# Patient Record
Sex: FEMALE | Race: WHITE | NOT HISPANIC OR LATINO | Employment: FULL TIME | URBAN - NONMETROPOLITAN AREA
[De-identification: names, ages, dates, MRNs, and addresses within clinical notes are randomized per-mention and may not be internally consistent; named-entity substitution may affect disease eponyms.]

---

## 2017-03-20 ENCOUNTER — OFFICE VISIT - GICH (OUTPATIENT)
Dept: SURGERY | Facility: OTHER | Age: 27
End: 2017-03-20

## 2017-03-20 ENCOUNTER — HISTORY (OUTPATIENT)
Dept: FAMILY MEDICINE | Facility: OTHER | Age: 27
End: 2017-03-20

## 2017-03-20 ENCOUNTER — HOSPITAL ENCOUNTER (OUTPATIENT)
Dept: RADIOLOGY | Facility: OTHER | Age: 27
End: 2017-03-20
Attending: NURSE PRACTITIONER

## 2017-03-20 ENCOUNTER — OFFICE VISIT - GICH (OUTPATIENT)
Dept: FAMILY MEDICINE | Facility: OTHER | Age: 27
End: 2017-03-20

## 2017-03-20 DIAGNOSIS — L02.411 CUTANEOUS ABSCESS OF RIGHT AXILLA: ICD-10-CM

## 2017-03-20 DIAGNOSIS — L02.91 CUTANEOUS ABSCESS: ICD-10-CM

## 2017-03-20 DIAGNOSIS — L73.2 HIDRADENITIS SUPPURATIVA: ICD-10-CM

## 2017-03-20 DIAGNOSIS — L02.92 FURUNCLE: ICD-10-CM

## 2017-03-20 DIAGNOSIS — M79.621 PAIN OF RIGHT UPPER ARM: ICD-10-CM

## 2017-03-20 LAB
ABSOLUTE BASOPHILS - HISTORICAL: 0.1 THOU/CU MM
ABSOLUTE EOSINOPHILS - HISTORICAL: 0.9 THOU/CU MM
ABSOLUTE LYMPHOCYTES - HISTORICAL: 3.6 THOU/CU MM (ref 0.9–2.9)
ABSOLUTE MONOCYTES - HISTORICAL: 1.2 THOU/CU MM
ABSOLUTE NEUTROPHILS - HISTORICAL: 10.9 THOU/CU MM (ref 1.7–7)
ANION GAP - HISTORICAL: 8 (ref 5–18)
BASOPHILS # BLD AUTO: 0.8 %
BUN SERPL-MCNC: 7 MG/DL (ref 7–25)
BUN/CREAT RATIO - HISTORICAL: 10
CALCIUM SERPL-MCNC: 10.7 MG/DL (ref 8.6–10.3)
CHLORIDE SERPLBLD-SCNC: 105 MMOL/L (ref 98–107)
CO2 SERPL-SCNC: 23 MMOL/L (ref 21–31)
CREAT SERPL-MCNC: 0.7 MG/DL (ref 0.7–1.3)
EOSINOPHIL NFR BLD AUTO: 5.4 %
ERYTHROCYTE [DISTWIDTH] IN BLOOD BY AUTOMATED COUNT: 13.6 % (ref 11.5–15.5)
GFR IF NOT AFRICAN AMERICAN - HISTORICAL: >60 ML/MIN/1.73M2
GLUCOSE SERPL-MCNC: 91 MG/DL (ref 70–105)
HCT VFR BLD AUTO: 40.6 % (ref 33–51)
HEMOGLOBIN: 13.2 G/DL (ref 12–16)
LYMPHOCYTES NFR BLD AUTO: 21.7 % (ref 20–44)
MCH RBC QN AUTO: 25.6 PG (ref 26–34)
MCHC RBC AUTO-ENTMCNC: 32.5 G/DL (ref 32–36)
MCV RBC AUTO: 79 FL (ref 80–100)
MONOCYTES NFR BLD AUTO: 7 %
NEUTROPHILS NFR BLD AUTO: 65.1 % (ref 42–72)
PLATELET # BLD AUTO: 347 THOU/CU MM (ref 140–440)
PMV BLD: 7.4 FL (ref 6.5–11)
POTASSIUM SERPL-SCNC: 3.4 MMOL/L (ref 3.5–5.1)
RED BLOOD COUNT - HISTORICAL: 5.17 MIL/CU MM (ref 4–5.2)
SODIUM SERPL-SCNC: 136 MMOL/L (ref 133–143)
WHITE BLOOD COUNT - HISTORICAL: 16.8 THOU/CU MM (ref 4.5–11)

## 2017-03-21 ENCOUNTER — COMMUNICATION - GICH (OUTPATIENT)
Dept: SURGERY | Facility: OTHER | Age: 27
End: 2017-03-21

## 2017-03-22 ENCOUNTER — COMMUNICATION - GICH (OUTPATIENT)
Dept: FAMILY MEDICINE | Facility: OTHER | Age: 27
End: 2017-03-22

## 2017-03-23 ENCOUNTER — AMBULATORY - GICH (OUTPATIENT)
Dept: SURGERY | Facility: OTHER | Age: 27
End: 2017-03-23

## 2017-03-23 DIAGNOSIS — L02.818 CUTANEOUS ABSCESS OF OTHER SITES: ICD-10-CM

## 2017-03-23 LAB
CULTURE - HISTORICAL: ABNORMAL
CULTURE - HISTORICAL: ABNORMAL
GRAM STAIN: ABNORMAL
SPECIMEN DESCRIPTION - HISTORICAL: ABNORMAL
SUSCEPTIBILITY RESULT - HISTORICAL: ABNORMAL

## 2017-03-24 ENCOUNTER — HISTORY (OUTPATIENT)
Dept: SURGERY | Facility: OTHER | Age: 27
End: 2017-03-24

## 2017-03-24 ENCOUNTER — OFFICE VISIT - GICH (OUTPATIENT)
Dept: SURGERY | Facility: OTHER | Age: 27
End: 2017-03-24

## 2017-03-24 DIAGNOSIS — L02.91 CUTANEOUS ABSCESS: ICD-10-CM

## 2017-03-31 ENCOUNTER — OFFICE VISIT - GICH (OUTPATIENT)
Dept: SURGERY | Facility: OTHER | Age: 27
End: 2017-03-31

## 2017-03-31 DIAGNOSIS — L02.411 CUTANEOUS ABSCESS OF RIGHT AXILLA: ICD-10-CM

## 2018-01-03 NOTE — NURSING NOTE
Patient Information     Patient Name MRN Antonette Arias [id number] Female 1990      Nursing Note by Karma Abarca at 3/20/2017  3:15 PM     Author:  Karma Abarca Service:  (none) Author Type:  (none)     Filed:  3/20/2017  3:49 PM Encounter Date:  3/20/2017 Status:  Signed     :  Karma Abarca            Antonette Sears is a 26 y.o. Female here with an abscess in right armpit. States went to a walk in clinic in [place] this past weekend and had it drained.  Today now is more red, swollen and painful.  Was not started on an antibiotic.   Patricia Abarca LPN ...... 3/20/2017 3:18 PM

## 2018-01-04 NOTE — TELEPHONE ENCOUNTER
Patient Information     Patient Name MRAntonette Koehler [id number] Female 1990      Telephone Encounter by Sunita Polo at 3/22/2017 12:41 PM     Author:  Sunita Polo Service:  (none) Author Type:  (none)     Filed:  3/22/2017 12:50 PM Encounter Date:  3/22/2017 Status:  Signed     :  Sunita Polo            Spoke with Patient and she states that she was given Norco 5/325 for her abscess.  She states she is premedicating before her packing changes and she is still needing 2 to 3 pills due to the pain.  Is there any other option for pain control.  She states she is just miserable.   Sunita Polo LPN........................3/22/2017  12:50 PM

## 2018-01-04 NOTE — TELEPHONE ENCOUNTER
Patient Information     Patient Name MRN Sex Antonette Nice [id number] Female 1990      Telephone Encounter by Dominique Ibarra DO at 3/21/2017 12:15 PM     Author:  Dominique Ibarra DO  Service:  (none) Author Type:  PHYS- Osteopathic     Filed:  3/21/2017 12:17 PM  Encounter Date:  3/21/2017 Status:  Addendum     :  Dominique Ibarra DO (PHYS- Osteopathic)        Related Notes: Original Note by Dominique Ibarra DO (PHYS- Osteopathic) filed at 3/21/2017 12:16 PM            Patient started on Augmentin  We cultured the wound here as well last pm- these are still pending.    Should cover proteus  Should have follow up appointment for Friday w/ [place]

## 2018-01-04 NOTE — TELEPHONE ENCOUNTER
Patient Information     Patient Name MRN Antonette Arias [id number] Female 1990      Telephone Encounter by Clarita Coffman at 3/21/2017 11:13 AM     Author:  Clarita Coffman Service:  (none) Author Type:  (none)     Filed:  3/21/2017 11:17 AM Encounter Date:  3/21/2017 Status:  Signed     :  Clarita Coffman            Patient wanted us to know that  Culture swab from [facility] came back as Proteus?  It was done on Saturday.  Clarita Coffman LPN ....................  3/21/2017   11:17 AM

## 2018-01-04 NOTE — TELEPHONE ENCOUNTER
Patient Information     Patient Name MRN Antonette Arias [id number] Female 1990      Telephone Encounter by Clarita Coffman at 3/22/2017  1:46 PM     Author:  Clarita Coffman Service:  (none) Author Type:  (none)     Filed:  3/22/2017  1:49 PM Encounter Date:  3/22/2017 Status:  Signed     :  Clarita Coffman            I called patient and she said she is changing the dressing 2 times a day and it is very painful and uses 2-3 pain pills for every dressing change.  Asking for something stronger.  Does not want to run out of pills. Please advise.    Clarita Coffman LPN ....................  3/22/2017   1:49 PM

## 2018-01-04 NOTE — TELEPHONE ENCOUNTER
Patient Information     Patient Name MRN Antonette Arias [id number] Female 1990      Telephone Encounter by Clarita Coffman at 3/21/2017  2:27 PM     Author:  Clarita Coffman Service:  (none) Author Type:  (none)     Filed:  3/21/2017  2:27 PM Encounter Date:  3/21/2017 Status:  Signed     :  Clarita Coffman            Patient notified.  Clarita Coffman LPN ....................  3/21/2017   2:27 PM

## 2018-01-04 NOTE — PROGRESS NOTES
Patient Information     Patient Name MRN Sex Antonette Nice [id number] Female 1990      Progress Notes by Dominique Ibarra DO at 3/31/2017  2:30 PM     Author:  Dominique Ibarra DO Service:  (none) Author Type:  PHYS- Osteopathic     Filed:  4/3/2017  1:33 PM Encounter Date:  3/31/2017 Status:  Signed     :  Dominique Ibarra DO (PHYS- Osteopathic)            Patient is doing well post-op from there recent I & D right axillae.  She has x2 more days  Of antibiotics.  Still c/o pain.  Min drainage    She has been having no nausea or vomiting; no chest pain, shortness of breath or coughing up anything. Patient has been urinating without any difficulties and moving bowel w/ no straining or bleeding. Patient has no calf pain swelling, tenderness, or redness. Patient has been sleeping at night with no problems. Patient has been ambulating without difficulty. Patient has been able to tolerate a regular diet. Patient has had good relief with previously prescribed pain meds.  No diarrhea.  No fevers.     LMP  (LMP Unknown)    HEENT: all negative  No jaundice.  No eye redness or pain.  No throat pain.  L: CTA b/l  Abdom: + BS. no distensions.  LOWER EXTREMITY: no swelling or calf pain  The surrounding tissue is pink and healthy.  There is no redness or swelling.  Min drainage.   ?  Pathology: see Epic  ?      Pt should keep the area clean and dry: wash with plain soap and water. Keep covered. Does not need to put anything on the lesion.   No strenuous activity or hot tubs/sauna's/pool/lake x1 week. Ice and NSAID's for pain. Monitor for redness/drainage/swelling/increase in pain/temp > 101. May have serous drainage/should not be purulent. Call Clinic if these occur.  As far as heeling: may be red and firm for about 1-3 weeks. This is the normal heeling process and will smooth out to a silvery/white line. Avoid sun exposure X1 year. May take months for redness to dissipate. If is sun burnt, will stay red.  Can use vit E oil.  Today we discussed wound care, activities, return to work, warnings signs. Pt is doing well.    Can stop packing.  Finish antibiotics.  Just wash w/ soap and water several times a day.  She has had C.  Dif in the past so chronic minocycline therapy is probably not an option for her.  Stop shaving and use clippers.  Also needs to stop smoking- esig still has nicotine in it.  Losing weight would also help.  Patient father has as well.  Could consider topical clindamycin if recurs.       Patient Active Problem List     Diagnosis  Code     CONTRACEPTIVE MANAGEMENT Z30.09     ABDOMINAL PAIN RIGHT LOWER QUADRANT \T\ EPIGASTRIC R10.31     ASTHMA, PERSISTENT, MILD J45.909     GERD K21.9     MIGRAINE HEADACHE G43.909     ALLERGIC RHINITIS J30.9     OTITIS MEDIA, LEFT H66.90     UNS D/O MENSTRUATION\T\OTH ABN BLEED FE GNT TRACT N92.6, N93.9     Cutaneous abscess of right axilla L02.411         Call the office if have any questions or concern's.  F/u with PCP for routine medical care.  Nielsen not require further pain med's.  Will F/U :myron Ibarra, DO

## 2018-01-04 NOTE — PATIENT INSTRUCTIONS
Patient Information     Patient Name MRN Sex Antonette Nice [id number] Female 1990      Patient Instructions by Gely Kennedy NP at 3/20/2017  3:15 PM     Author:  Gely Kennedy NP Service:  (none) Author Type:  PHYS- Nurse Practitioner     Filed:  3/20/2017  6:32 PM Encounter Date:  3/20/2017 Status:  Signed     :  Gely Kennedy NP (PHYS- Nurse Practitioner)            Follow-up with surgeon  as scheduled    Call surgeon's office for any concerns as needed

## 2018-01-04 NOTE — TELEPHONE ENCOUNTER
Patient Information     Patient Name MRN Antonette Arias [id number] Female 1990      Telephone Encounter by Renetta Brooks MD at 3/22/2017  3:03 PM     Author:  Renetta Brooks MD Service:  (none) Author Type:  Physician     Filed:  3/22/2017  3:11 PM Encounter Date:  3/22/2017 Status:  Signed     :  Renetta Brooks MD (Physician)            I spoke with the patient. She reports to me that she is changing her dressing twice daily. She is taking pain pills before the dressing changes. She denies taking more than 6 pain pills a day. I encouraged her to take a shower with dressing changes and to call or be seen if she is having pain that is not relieved by the pain pills. She has had the abscess drained and I would expect the pain to be improving if she is healing as we expect. She has follow up with Dr. Ibarra 3/24. She will call if she has further questions. Renetta Brooks MD ....................  3/22/2017   3:11 PM'

## 2018-01-04 NOTE — NURSING NOTE
Patient Information     Patient Name MRN Antonette Arias [id number] Female 1990      Nursing Note by Clarita Coffman at 3/31/2017  2:30 PM     Author:  Clarita Coffman Service:  (none) Author Type:  (none)     Filed:  3/31/2017  2:17 PM Encounter Date:  3/31/2017 Status:  Signed     :  Clarita Coffman            Patient comes in for follow up on right axillary.  Clarita Coffman LPN ....................  3/31/2017   2:16 PM

## 2018-01-04 NOTE — PATIENT INSTRUCTIONS
Patient Information     Patient Name MRN Sex Antonette Nice [id number] Female 1990      Patient Instructions by Dominique Ibarra DO at 3/31/2017  2:30 PM     Author:  Dominique Ibarra DO Service:  (none) Author Type:  PHYS- Osteopathic     Filed:  4/3/2017  1:32 PM Encounter Date:  3/31/2017 Status:  Signed     :  Dominique Ibarra DO (PHYS- Osteopathic)            Can stop packing  Wash w/ soap and water BID until healed   Stop smoking

## 2018-01-04 NOTE — PROGRESS NOTES
Patient Information     Patient Name MRN Antonette Arias [id number] Female 1990      Progress Notes by Dominique Ibarra DO at 3/20/2017  7:50 AM     Author:  Dominique Ibarra DO Service:  (none) Author Type:  PHYS- Osteopathic     Filed:  3/20/2017  9:08 PM Encounter Date:  3/20/2017 Status:  Signed     :  Dominique Ibarra DO (PHYS- Osteopathic)            Clinic Procedure Note      PMx, PSx, and social Hx are reviewed and updated in [facility]    Current Outpatient Prescriptions on File Prior to Visit       Medication  Sig Dispense Refill     cetirizine (ZYRTEC) 10 mg tablet TK 1 T PO ONCE D UTD  2     divalproex (DEPAKOTE ER) 500 mg Extended-Release tablet        montelukast (SINGULAIR) 10 mg tablet        omeprazole (PRILOSEC) 20 mg Delayed-Release capsule TK 1 C PO QD  2     ondansetron (ZOFRAN) 4 mg tablet TK 1 T PO Q 4 H PRN NV  0     PROAIR HFA 90 mcg/actuation inhaler        sertraline (ZOLOFT) 100 mg tablet TK 1 T PO QD  11     No current facility-administered medications on file prior to visit.        Allergies     Allergen  Reactions     Codeine Nausea And Vomiting         No visits with results within 90 Day(s) from this visit.  Latest known visit with results is:    Office Visit on 10/05/2014      Component  Date Value     STREP A ANTIGEN           10/05/2014 Negative          Any imagining associated with this vist was reviewed.      Indication  Antonette Sears is seen at the request of  Socorro Kennedy NP.    Antonette Sears  presents for I & D of abscess. We have discussed this procedure, including option  of not performing surgery, technique of surgery and potential for  bleeding/infection/scarring/need for removal of more tissue and post-procedural care.  Patient is able to do post procedural dressing changes and understands what is  Expected.        Exam:US AXILLA RIGHT     History: Axillary pain, right     Findings: Scanning is completed in the right axillary region at the site of  palpable concern. Scanning here demonstrates a mixed echogenicity amorphous but well-defined abnormality. The wall is of medium thickness and hyperemic. The abnormality lies immediately deep to the skin with no definite connection through the skin. The abnormality measures approximately 1.5 x 1.7 x 1.9 cm.     Impression: The patient has a history of a skin infection in the region. The finding is most likely due to a necrotic lymph node. An abscess with this appearance is not completely excluded, but no definite fluid is seen within the abnormality.     Electronically Signed By: Kenny Petersen M.D. on 3/20/2017       OBJECTIVE:    Antonette Sears appears well. Vitals are normal. The patient has no allergies to lidocaine or  suture material. The patient not on any blood thinners.  Informed consent was obtained prior to beginning the procedure. The area was marked  and doubled checked/ID ed with the pt. PAUSE for the CAUSE completed. The risks of  lesion removal include but are not limited to: bleeding, infection, scarring, reoccurrence,  and the need for removal of more tissue, and complications of anesthesia.    Location  ASSESSMENT: the abscess has been there about 5 days.  Went into an UC in [place] yesterday and they did a needle drainage and couldn't get any material out.    There is not much redness, but the patient is exquisitely tender and there is a gold ball sized mass in right axillae.  Fluctuant.  Patient has had history of hidradenitis and had the area excised in the past.  Non-smoker.  No DIABETES MELLITUS .  No steroids/immunosuppressents.    Location: /RIGHT axillae    Procedural Sedation  1% lidocaine w/ Epinephrine  10cc    Technique  Patient appears well. Vitals are normal. The patient has no allergies to lidocaine or  suture material. The patient not on any blood thinners.  Antonette Sears has no history of keloids or problems with healing in the past.    Skin: see HPI    Informed consent was  "obtained prior to beginning the procedure. The area was marked  and doubled checked/ID ed with the pt. PAUSE for the CAUSE completed. The risks of  lesion removal include but are not limited to: bleeding, infection, scarring, reoccurrence,  and the need for removal of more tissue, and complications of anesthesia.  After informed consent was obtained, using Chloroprep for cleansing and 1%   Lidocaine w/ epi for anesthetic; using sterile technique, PROCEDURE:I & D  was performed.    The lesion is 3cm in size. The incision was 1 Cm long. About 20cc of purulents material is evacuated.  It is irrigated and packed w/ 1/4\" packing.   Sterile dressing are applied, and wound care instructions provided. The procedure was well tolerated without complications.  Patient was given instructions verbally and written in packing and given supplies as well.  She also received a  IM shot of rocephin as well.      Plan:  The patient will follow up  On Friday.  Patient  was given instructions in wound care, acivity, warning signs, appointment for follow up.  If the patient has any questions or concerns, should call our clinic or go to ER/urgent  care after hours. Patient expressed understanding in directions for care, and was given a  written copy of instructions.    Rx=see EPIC    Pt tolerated the procedure well without complications  Patient was given instruction in activity restrictions, wound care and dressing changes. Medication usage, diet, warning signs to look for (and what to do if they occur), and an appointment for follow-up.          Pain Control    Use ice!  Ice keeps the swelling down and swelling is what causes pain.  Never apply ice directly to the skin.  Wrap it in a towel or cloth.  Apply ice 20 minutes on and 20 minutes off for pain control.  Use as needed.    Take tylenol 325 mg by mouth with food every 4 hours as needed for pain. Or ibuprofen 400 mg by mouth with food every 4 hours as needed for pain.  Do not take " tylenol if you have a history of heavy drinking , hepatits C or liver problems.  Do not take ibuprofen if you have a history of stomach ulcers/problems, bleeding problem or kidney issues.           Follow-up care      Follow up with your healthcare provider to ask how long sutures or staples should be left in place. Be sure to return for suture or staple removal as directed. If dissolving stitches were used in your mouth, these will not need to be removed. They should fall out or dissolve on their own.  If tape closures were used, remove them yourself when your healthcare provider tells you to if they have not fallen off on their own. If skin glue was used to close your incision, the glue will wear off by itself.    When to seek medical care  Call your healthcare provider right away if you has any of these:  More pain, redness, swelling, bleeding, or foul-smelling discharge around the incision area  Fever of 101 F (38.3 C) or higher, or as directed by your  healthcare provider  Shaking chills  Vomiting or nausea that doesn t go away  Numbness, coldness, or tingling around the incision area, or changes in skin color  Opening of the sutures or wound  Stitches or staples come apart or fall out or surgical tape falls off before 7 days, or as directed by your healthcare provider      Wound Packing  Discharge Instructions: Packing a Wound  Your healthcare provider wants you to care for a special dressing, or packing, in your wound. When a wound is deep, or when it tunnels under the skin, packing the wound can help it heal. The packing material soaks up any drainage from the wound, which helps the tissues heal from the inside out. Without the packing, the wound could close at the top. This would trap fluid and possibly bacteria in the deeper areas of the wound and lead to infection and impede healing. You were shown how to pack your wound before you left the hospital. The following guidelines will help you remember how to  take care of your wound.  Gather your supplies  Keep your supplies all in 1 place. Put them in a basket or large bag. You will need the following:    Packing material    Sterile wetting solution    Sterile gloves    A clean bowl    Scissors    A clean towel    Outer dressing material (a bandage to put on the top of the wound after you have packed it)    Tape    Cotton swabs    A small plastic bag  Clean up    Clean the area where you will set out your dressing supplies.    Wash your hands thoroughly with soap and water.    Put a clean towel over the area and set a clean bowl on it. Don t touch the inside of the bowl.  Prepare the packing material    Pour enough wetting solution into the clean bowl to wet the packing material.    Cut off a length of packing material and drop it carefully into the bowl of wetting solution. (Remember, the amount of packing material needed to fill the wound should become less and less as the area heals. You will need to adjust the length of the packing material over time.)    Cut pieces of tape to desired lengths. You will use these strips to secure your outer dressing. For now, hang the pieces of tape on the edge of your work surface.    Gently remove your existing bandage (old tape, outer dressing, and packing). Put these items in a small plastic bag for disposal.  Pack the wound    Wash your hands thoroughly again. Use soap and water.    Put on the gloves. Gently squeeze the packing material to get rid of excess wetting solution. The packing material should be wet, but not dripping.    Gently put the packing material into the wound. Packing should fill the wound space completely, but not tightly. Use a cotton swab to gently guide the packing into small or tunneled areas.    Open your outer dressing material and place it on the towel. Keep it away from the bowl, and don t get it wet.    Put the outer dressing over the packing and wound site.    Tape the outer dressing in place.     Remove your gloves.    Wash your hands 1 more time with soap and water.  Follow-up  Make a follow-up appointment. It is recommended that you be seen within 48 hours after the first time a packing is placed.  When to call your healthcare provider  Call your healthcare provider right away if you have any of the following:    Increased drainage from the wound    Redness in or around the wound    Wound tissue that changes from pink to white, yellow, or black in color    Odor coming from the wound    Increased size or depth of the wound    Fever above 100.4 F (38 C) or shaking chills

## 2018-01-04 NOTE — PROGRESS NOTES
Patient Information     Patient Name MRN Sex Antonette Nice [id number] Female 1990      Progress Notes by Gely Kennedy NP at 3/20/2017  3:15 PM     Author:  Gely Kennedy NP Service:  (none) Author Type:  PHYS- Nurse Practitioner     Filed:  3/20/2017  6:32 PM Encounter Date:  3/20/2017 Status:  Signed     :  Gely Kennedy NP (PHYS- Nurse Practitioner)            SUBJECTIVE:    Antonette Sears is a 26 y.o. female who presents for reported history of incision and drainage right axilla for a boil at a walk-in clinic in [place] 2 days ago. There were no antibiotics prescribed. Patient has been hot packing area as she does have a history of recurrent subdermal  boils with her history of hydradenitis. She is not on prophylactic low-dose antibiotics or topical. Patient reports she did have surgery bilateral axilla several years ago for recurrent issues with axillary hydradenitis boils and abscesses.  Patient reports her right axilla is very tender, mildly swollen with tenderness radiates from incision and drainage site across to the lateral aspect of her right breast and her upper arm that started after the topical anesthetic wore off and has persisted daily to the point where it is difficult for her to use her right arm. Denies any fever, chills, nausea.    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 Patient is here visiting mother.      HPI    Allergies     Allergen  Reactions     Codeine Nausea And Vomiting   , No family history on file.,   Current Outpatient Prescriptions on File Prior to Visit       Medication  Sig Dispense Refill     divalproex (DEPAKOTE ER) 500 mg Extended-Release tablet        montelukast (SINGULAIR) 10 mg tablet        PROAIR HFA 90 mcg/actuation inhaler        No current facility-administered medications on file prior to visit.    ,   Current Outpatient Prescriptions:      cetirizine (ZYRTEC) 10 mg tablet, TK 1 T PO ONCE D UTD, Disp: , Rfl: 2     divalproex (DEPAKOTE ER)  500 mg Extended-Release tablet, , Disp: , Rfl:      montelukast (SINGULAIR) 10 mg tablet, , Disp: , Rfl:      omeprazole (PRILOSEC) 20 mg Delayed-Release capsule, TK 1 C PO QD, Disp: , Rfl: 2     ondansetron (ZOFRAN) 4 mg tablet, TK 1 T PO Q 4 H PRN NV, Disp: , Rfl: 0     PROAIR HFA 90 mcg/actuation inhaler, , Disp: , Rfl:      sertraline (ZOLOFT) 100 mg tablet, TK 1 T PO QD, Disp: , Rfl: 11    Current Facility-Administered Medications:      cefTRIAXone injection (ROCEPHIN) 1 g, 1 g, Intra-Muscular, one time, Gely Kennedy NP     cefTRIAXone injection (ROCEPHIN) 1 g, 1 g, Intra-Muscular, one time, Gely Kennedy NP  Medications have been reviewed by me and are current to the best of my knowledge and ability.,   Past Medical History      Diagnosis   Date     Neck pain  2004     [employer] accident    ,   Patient Active Problem List      Diagnosis Date Noted     UNS D/O MENSTRUATION\T\OTH ABN BLEED FE GNT TRACT 06/01/2012     ABDOMINAL PAIN RIGHT LOWER QUADRANT \T\ EPIGASTRIC 04/04/2012     OTITIS MEDIA, LEFT 09/23/2011     CONTRACEPTIVE MANAGEMENT 07/06/2011     ASTHMA, PERSISTENT, MILD      GERD      MIGRAINE HEADACHE      ALLERGIC RHINITIS    ,   Past Surgical History      Procedure  Laterality Date     Cholecystectomy  Age 16    and   Social History     Substance Use Topics       Smoking status: Never Smoker     Smokeless tobacco: Never Used     Alcohol use No       REVIEW OF SYSTEMS:  Review of Systems   Constitutional: Positive for malaise/fatigue.   HENT: Negative.    Eyes: Negative.    Respiratory: Negative.    Cardiovascular: Negative.    Gastrointestinal: Negative.    Genitourinary: Negative.    Musculoskeletal: Positive for myalgias.   Skin: Negative.    Neurological: Negative for speech change.   Endo/Heme/Allergies: Negative.    Psychiatric/Behavioral: Negative.        OBJECTIVE:  /82  Pulse 84  Wt (!) 141.5 kg (312 lb)  LMP  (LMP Unknown)  BMI 58.95 kg/m2    EXAM:   Physical Exam    Constitutional: She is oriented to person, place, and time and well-developed, well-nourished, and in no distress.   HENT:   Head: Normocephalic and atraumatic.   Eyes: Conjunctivae are normal.   Neck: Normal range of motion. Neck supple.   Cardiovascular: Normal rate.    Pulmonary/Chest: Effort normal.   Musculoskeletal: She exhibits tenderness.   Range of motion bilateral upper extremities however right arm and shoulder tender with extension       Lymphadenopathy:     She has cervical adenopathy.   Neurological: She is alert and oriented to person, place, and time. Gait normal.   Skin: Skin is warm and dry. There is erythema.   Right axilla light pink diffuse about 4 cm area over I&D site generally edematous in comparison to left axilla, tenderness with palpation fluctuant. No migrating erythema  no focal erythema       Psychiatric: Mood, memory, affect and judgment normal.   Nursing note and vitals reviewed.      ASSESSMENT/PLAN:    ICD-10-CM    1. Axillary pain, right M79.621 US AXILLA RIGHT      CBC AND DIFFERENTIAL      BASIC METABOLIC PANEL      cefTRIAXone injection (ROCEPHIN) 1 g      CBC AND DIFFERENTIAL      BASIC METABOLIC PANEL      CBC WITH AUTO DIFFERENTIAL      cefTRIAXone injection (ROCEPHIN) 1 g   2. Boil L02.92 US AXILLA RIGHT      CBC AND DIFFERENTIAL      BASIC METABOLIC PANEL      cefTRIAXone injection (ROCEPHIN) 1 g      CBC AND DIFFERENTIAL      BASIC METABOLIC PANEL      CBC WITH AUTO DIFFERENTIAL      cefTRIAXone injection (ROCEPHIN) 1 g   3. Hidradenitis axillaris L73.2 US AXILLA RIGHT      CBC AND DIFFERENTIAL      cefTRIAXone injection (ROCEPHIN) 1 g      CBC AND DIFFERENTIAL      CBC WITH AUTO DIFFERENTIAL      cefTRIAXone injection (ROCEPHIN) 1 g    consulted with Dr Ibarra--recommends axillary ultrasound and possible I&D  Dr. Ibarra reviewed ultrasound and recommends I&D drainage    Patient was given Rocephin 1 g in clinic, tolerated well    Results for orders placed or performed  in visit on 03/20/17      BASIC METABOLIC PANEL      Result  Value Ref Range    SODIUM 136 133 - 143 mmol/L    POTASSIUM 3.4 (L) 3.5 - 5.1 mmol/L    CHLORIDE 105 98 - 107 mmol/L    CO2,TOTAL 23 21 - 31 mmol/L    ANION GAP 8 5 - 18                    GLUCOSE 91 70 - 105 mg/dL    CALCIUM 10.7 (H) 8.6 - 10.3 mg/dL    BUN 7 7 - 25 mg/dL    CREATININE 0.70 0.70 - 1.30 mg/dL    BUN/CREAT RATIO           10                    GFR if African American >60 >60 ml/min/1.73m2    GFR if not African American >60 >60 ml/min/1.73m2   CBC WITH AUTO DIFFERENTIAL      Result  Value Ref Range    WHITE BLOOD COUNT         16.8 (H) 4.5 - 11.0 thou/cu mm    RED BLOOD COUNT           5.17 4.00 - 5.20 mil/cu mm    HEMOGLOBIN                13.2 12.0 - 16.0 g/dL    HEMATOCRIT                40.6 33.0 - 51.0 %    MCV                       79 (L) 80 - 100 fL    MCH                       25.6 (L) 26.0 - 34.0 pg    MCHC                      32.5 32.0 - 36.0 g/dL    RDW                       13.6 11.5 - 15.5 %    PLATELET COUNT            347 140 - 440 thou/cu mm    MPV                       7.4 6.5 - 11.0 fL    NEUTROPHILS               65.1 42.0 - 72.0 %    LYMPHOCYTES               21.7 20.0 - 44.0 %    MONOCYTES                 7.0 <12.0 %    EOSINOPHILS               5.4 <8.0 %    BASOPHILS                 0.8 <3.0 %    ABSOLUTE NEUTROPHILS      10.9 (H) 1.7 - 7.0 thou/cu mm    ABSOLUTE LYMPHOCYTES      3.6 (H) 0.9 - 2.9 thou/cu mm    ABSOLUTE MONOCYTES        1.2 (H) <0.9 thou/cu mm    ABSOLUTE EOSINOPHILS      0.9 (H) <0.5 thou/cu mm    ABSOLUTE BASOPHILS        0.1 <0.3 thou/cu mm     Exam:US AXILLA RIGHT     History: Axillary pain, right     Findings: Scanning is completed in the right axillary region at the site of palpable concern. Scanning here demonstrates a mixed echogenicity amorphous but well-defined abnormality. The wall is of medium thickness and hyperemic. The abnormality lies immediately deep to the skin with no definite  connection through the skin. The abnormality measures approximately 1.5 x 1.7 x 1.9 cm.     Impression: The patient has a history of a skin infection in the region. The finding is most likely due to a necrotic lymph node. An abscess with this appearance is not completely excluded, but no definite fluid is seen within the abnormality.     Electronically Signed By: Kenny Petersen M.D. on 3/20/2017 4:49 PM   Results History     Reviewed ultrasound results with radiologist      Plan: Patient was escorted to procedure room for I&D Dr. Ibarra    See surgeon procedural notes in Epic--    Patient was given follow-up appointment card with Dr. Ibarra on Friday, March 24    Started to call clinic for any concerns or worsening symptoms

## 2018-01-04 NOTE — PATIENT INSTRUCTIONS
Patient Information     Patient Name MRN Sex Antonette Nice [id number] Female 1990      Patient Instructions by Dominique Ibarra DO at 3/24/2017  2:00 PM     Author:  Dominique Ibarra DO Service:  (none) Author Type:  PHYS- Osteopathic     Filed:  3/24/2017  2:18 PM Encounter Date:  3/24/2017 Status:  Signed     :  Dominique Ibarra DO (PHYS- Osteopathic)            -continue packing daily while on antibiotics  -finish all antibiotics  -Follow up next Friday

## 2018-01-04 NOTE — PROGRESS NOTES
Patient Information     Patient Name MRN Sex Antonette Nice [id number] Female 1990      Progress Notes by Dominique Ibarra DO at 3/24/2017  2:00 PM     Author:  Dominique Ibarra DO Service:  (none) Author Type:  PHYS- Osteopathic     Filed:  3/24/2017  2:27 PM Encounter Date:  3/24/2017 Status:  Signed     :  Dominique Ibarra DO (PHYS- Osteopathic)             Antonette Sears is a 26 y.o. female  Pt of  Jose Angel Quintero MD  who presents today for follow up /chronic   wound care- soft tissue infection right axilla.  The patient   has been having no nausea or vomiting; no chest pain, shortness of breath or productive cough.   Patient has been urinating without any difficulties and moving bowel w/ no straining or bleeding. No problems with diarrhea (continues to eat yogurt daily if on abx).  No fever/chills/sweats.   Patient has no calf pain swelling, tenderness, or redness.  Patient has been sleeping at night with no problems.  She   has been ambulating without difficulty.  Patient has been able to tolerate a regular diet.  Patient has had good relief with previously prescribed pain medications.    Past Medical, social, family histories, medications, and allergies reviewed and updated     ROS: 4 point ROS neg other than the symptoms noted above in the HPI.    Wound location: R axilla    Wound type: infectious    Size:   Width 1 cm  Length  1 cm  Depth  3 cm    Undermining/tunneling: no    Wound Base: Slough  50%    Drainage: sero-purulent    Surrounding Tissue: Intact    Pain moderate    Signs of infection :no redness.     Abx: changed to cipro based on culture sensitivity     Vascular status:  n/a  Protein status:  unknown  Pressure offloading: without  Smoker:  no  Diabetes no    Prior to Admission medications          Medication Sig Start Date End Date Taking? Last Dose Authorizing Provider   amoxicillin-clavulanate 875-125 mg tablet (AUGMENTIN) Take 1 tablet by mouth 2 times daily with meals  for 7 days. 3/20/17 3/27/17   Dominique Ibarra, DO   cetirizine (ZYRTEC) 10 mg tablet TK 1 T PO ONCE D UTD 2/22/17    Reported, Patient   ciprofloxacin HCl (CIPRO) 500 mg tablet Take 1 tablet by mouth 2 times daily for 10 days. 3/23/17 4/2/17   Dominique Ibarra DO   divalproex (DEPAKOTE ER) 500 mg Extended-Release tablet  9/26/14    Reported, Patient   HYDROcodone-acetaminophen, 5-325 mg, (NORCO) per tablet Take 1 tablet by mouth every 4 hours if needed  for Pain 3/24/17  Yes  Dominique Ibarra,    HYDROcodone-acetaminophen, 5-325 mg, (NORCO) per tablet Take 1 tablet by mouth every 4 hours if needed  for Pain Max acetaminophen dose: 4000 mg in 24 hrs. 3/20/17    Dominique Ibarra DO   montelukast (SINGULAIR) 10 mg tablet  9/26/14    Reported, Patient   omeprazole (PRILOSEC) 20 mg Delayed-Release capsule TK 1 C PO QD 12/19/16    Reported, Patient   ondansetron (ZOFRAN) 4 mg tablet TK 1 T PO Q 4 H PRN NV 2/25/17    Reported, Patient   PROAIR HFA 90 mcg/actuation inhaler  9/26/14    Reported, Patient   sertraline (ZOLOFT) 100 mg tablet TK 1 T PO QD 2/22/17    Reported, Patient       Social History     Social History        Marital status:  Single     Spouse name: N/A     Number of children:  N/A     Years of education:  N/A     Occupational History      Not on file.     Social History Main Topics       Smoking status: Never Smoker     Smokeless tobacco: Never Used     Alcohol use No     Drug use: No     Sexual activity: Not on file     Other Topics  Concern     Not on file      Social History Narrative     Lives with her mother, mother's boyfriend and sister.  She was in college.  Now working full time in [employer].           Hospital Outpatient Visit on 03/20/2017      Component  Date Value     CULTURE 03/20/2017 RESULT*     CULTURE 03/20/2017 1+ Proteus mirabilis      GRAM STAIN                03/20/2017 4+ PMNs      GRAM STAIN                03/20/2017 1+ Epithelial cells      GRAM STAIN                03/20/2017 3+  Gram Positive Cocci      GRAM STAIN                03/20/2017 1+ Gram Negative Bacilli    Office Visit on 03/20/2017      Component  Date Value     SODIUM 03/20/2017 136      POTASSIUM 03/20/2017 3.4*     CHLORIDE 03/20/2017 105      CO2,TOTAL 03/20/2017 23      ANION GAP 03/20/2017 8      GLUCOSE 03/20/2017 91      CALCIUM 03/20/2017 10.7*     BUN 03/20/2017 7      CREATININE 03/20/2017 0.70      BUN/CREAT RATIO           03/20/2017 10      GFR if  03/20/2017 >60      GFR if not  Ameri* 03/20/2017 >60      WHITE BLOOD COUNT         03/20/2017 16.8*     RED BLOOD COUNT           03/20/2017 5.17      HEMOGLOBIN                03/20/2017 13.2      HEMATOCRIT                03/20/2017 40.6      MCV                       03/20/2017 79*     MCH                       03/20/2017 25.6*     MCHC                      03/20/2017 32.5      RDW                       03/20/2017 13.6      PLATELET COUNT            03/20/2017 347      MPV                       03/20/2017 7.4      NEUTROPHILS               03/20/2017 65.1      LYMPHOCYTES               03/20/2017 21.7      MONOCYTES                 03/20/2017 7.0      EOSINOPHILS               03/20/2017 5.4      BASOPHILS                 03/20/2017 0.8      ABSOLUTE NEUTROPHILS      03/20/2017 10.9*     ABSOLUTE LYMPHOCYTES      03/20/2017 3.6*     ABSOLUTE MONOCYTES        03/20/2017 1.2*     ABSOLUTE EOSINOPHILS      03/20/2017 0.9*     ABSOLUTE BASOPHILS        03/20/2017 0.1              Patient Active Problem List     Diagnosis  Code     CONTRACEPTIVE MANAGEMENT Z30.09     ABDOMINAL PAIN RIGHT LOWER QUADRANT \T\ EPIGASTRIC R10.31     ASTHMA, PERSISTENT, MILD J45.909     GERD K21.9     MIGRAINE HEADACHE G43.909     ALLERGIC RHINITIS J30.9     OTITIS MEDIA, LEFT H66.90     UNS D/O MENSTRUATION\T\OTH ABN BLEED FE GNT TRACT N92.6, N93.9     Cutaneous abscess of right axilla L02.411         /82  Pulse 88  Temp 100.2  F (37.9  C) (Tympanic)  Wt (!) 141.5  kg (312 lb)  LMP  (LMP Unknown)  Breastfeeding? No  BMI 58.95 kg/m2                    PHYSICAL EXAM  GENERAL APPEARANCE: Alert, healthy appearance, oriented, in no acute distress  SKIN:see above   HYDRATION: Well hydrated  HEAD, EYES, EARS, NECK, AND THROAT: Head is normocephalic, pupils equal, round, reactive to light and accommodation, ocular movement intact, sclera clear and no jaundice. Dentition intact.  NECK: Supple, no lymphadenopathy. Trachea midline.  LUNGS: normal respiration, clear to auscultation  HEART: Regular rate and rhythm,   EXTREMITY: No edema or cyanosis  ABDOMEN: No hepatosplenomegaly, non tender to palpation, no masses or distention, no hernias. Normal bowel sounds  NEURO: no focal neuro deficits.             PLAN    The wound is not sharply debrided.     It is probed and irrigated.  Ither is some granulation tissue and is 60% smaller than on Monday.  Wound care regimen:cont packing wet to dry   Note given for work  Follow up on friday    All supplies were arranged  Pain meds/RX- see EPIC  Prescription for med pain renewed   Pt was given instructions in diet, lifestyle modifications, bathing, supplements, any newly prescribed medications.    Pt was given instructions in wound care/activity and warning signs; pain meds, appt. for f/u and if has any questions or concerns, should call our clinic  or go to ER if after hrs.   All concerns addressed and questions answered.    Pt should F/U: frideepaka                            30 minutes   Number of minutes spent with the pt.  >50% of the time is spent in  Counseling.    Cc: Jose Angel Quintero MD

## 2018-01-04 NOTE — PATIENT INSTRUCTIONS
Patient Information     Patient Name MRN Sex Antonette Nice [id number] Female 1990      Patient Instructions by Dominique Ibarra DO at 3/20/2017  7:50 AM     Author:  Dominique Ibarra DO Service:  (none) Author Type:  PHYS- Osteopathic     Filed:  3/20/2017  6:18 PM Encounter Date:  3/20/2017 Status:  Signed     :  Dominique Ibarra DO (PHYS- Osteopathic)            Wound Packing  Discharge Instructions: Packing a Wound  Your healthcare provider wants you to care for a special dressing, or packing, in your wound. When a wound is deep, or when it tunnels under the skin, packing the wound can help it heal. The packing material soaks up any drainage from the wound, which helps the tissues heal from the inside out. Without the packing, the wound could close at the top. This would trap fluid and possibly bacteria in the deeper areas of the wound and lead to infection and impede healing. You were shown how to pack your wound before you left the hospital. The following guidelines will help you remember how to take care of your wound.  Gather your supplies  Keep your supplies all in 1 place. Put them in a basket or large bag. You will need the following:    Packing material    Sterile wetting solution    Sterile gloves    A clean bowl    Scissors    A clean towel    Outer dressing material (a bandage to put on the top of the wound after you have packed it)    Tape    Cotton swabs    A small plastic bag  Clean up    Clean the area where you will set out your dressing supplies.    Wash your hands thoroughly with soap and water.    Put a clean towel over the area and set a clean bowl on it. Don t touch the inside of the bowl.  Prepare the packing material    Pour enough wetting solution into the clean bowl to wet the packing material.    Cut off a length of packing material and drop it carefully into the bowl of wetting solution. (Remember, the amount of packing material needed to fill the wound should  become less and less as the area heals. You will need to adjust the length of the packing material over time.)    Cut pieces of tape to desired lengths. You will use these strips to secure your outer dressing. For now, hang the pieces of tape on the edge of your work surface.    Gently remove your existing bandage (old tape, outer dressing, and packing). Put these items in a small plastic bag for disposal.  Pack the wound    Wash your hands thoroughly again. Use soap and water.    Put on the gloves. Gently squeeze the packing material to get rid of excess wetting solution. The packing material should be wet, but not dripping.    Gently put the packing material into the wound. Packing should fill the wound space completely, but not tightly. Use a cotton swab to gently guide the packing into small or tunneled areas.    Open your outer dressing material and place it on the towel. Keep it away from the bowl, and don t get it wet.    Put the outer dressing over the packing and wound site.    Tape the outer dressing in place.    Remove your gloves.    Wash your hands 1 more time with soap and water.  Follow-up  Make a follow-up appointment. It is recommended that you be seen within 48 hours after the first time a packing is placed.  When to call your healthcare provider  Call your healthcare provider right away if you have any of the following:    Increased drainage from the wound    Redness in or around the wound    Wound tissue that changes from pink to white, yellow, or black in color    Odor coming from the wound    Increased size or depth of the wound    Fever above 100.4 F (38 C) or shaking chills

## 2018-01-04 NOTE — NURSING NOTE
Patient Information     Patient Name MRN Antonette Arias [id number] Female 1990      Nursing Note by Pippa Perez LPN at 3/24/2017  2:00 PM     Author:  Pippa Perez LPN Service:  (none) Author Type:  NURS- Licensed Practical Nurse     Filed:  3/24/2017  2:25 PM Encounter Date:  3/24/2017 Status:  Signed     :  Pippa Perez LPN (NURS- Licensed Practical Nurse)            The patient is here today to have a follow up from a procedure on 3-.  Pippa Perez LPN......3/24/2017  1:49 PM

## 2018-01-04 NOTE — TELEPHONE ENCOUNTER
Patient Information     Patient Name MRN Antonette Arias [id number] Female 1990      Telephone Encounter by Clarita Coffman at 3/21/2017 12:44 PM     Author:  Clarita Coffman Service:  (none) Author Type:  (none)     Filed:  3/21/2017 12:44 PM Encounter Date:  3/21/2017 Status:  Signed     :  Clarita Coffman            TCB>  Clarita Coffman LPN ....................  3/21/2017   12:44 PM

## 2018-01-04 NOTE — PROGRESS NOTES
Patient Information     Patient Name MRN Antonette Arias [id number] Female 1990      Progress Notes by Dominique Ibarra DO at 3/23/2017 10:32 AM     Author:  Dominique Ibarra DO Service:  (none) Author Type:  PHYS- Osteopathic     Filed:  3/23/2017 10:32 AM Date of Service:  3/23/2017 10:32 AM Status:  Signed     :  Dominique Ibarra DO (PHYS- Osteopathic)            Our cultures are showing that patient proteus is resistant to Augmentin.  New prescription called in for Cipro.  This might be why she is having so much pain.

## 2018-01-27 VITALS
DIASTOLIC BLOOD PRESSURE: 82 MMHG | SYSTOLIC BLOOD PRESSURE: 110 MMHG | BODY MASS INDEX: 58.95 KG/M2 | WEIGHT: 293 LBS | TEMPERATURE: 100.2 F | HEART RATE: 88 BPM

## 2018-01-27 VITALS — SYSTOLIC BLOOD PRESSURE: 102 MMHG | WEIGHT: 293 LBS | DIASTOLIC BLOOD PRESSURE: 82 MMHG | HEART RATE: 84 BPM

## 2018-02-13 LAB
HIV 1&2 EXT: NORMAL
PAP-ABSTRACT: NORMAL

## 2018-02-19 ENCOUNTER — DOCUMENTATION ONLY (OUTPATIENT)
Dept: FAMILY MEDICINE | Facility: OTHER | Age: 28
End: 2018-02-19

## 2018-02-19 PROBLEM — J30.9 ALLERGIC RHINITIS: Status: ACTIVE | Noted: 2018-02-19

## 2018-02-19 PROBLEM — K21.9 ESOPHAGEAL REFLUX: Status: ACTIVE | Noted: 2018-02-19

## 2018-02-19 PROBLEM — J45.909 ASTHMA: Status: ACTIVE | Noted: 2018-02-19

## 2018-02-19 PROBLEM — L02.411 CUTANEOUS ABSCESS OF RIGHT AXILLA: Status: ACTIVE | Noted: 2017-03-20

## 2018-02-19 PROBLEM — G43.909 MIGRAINE HEADACHE: Status: ACTIVE | Noted: 2018-02-19

## 2018-02-19 RX ORDER — SERTRALINE HYDROCHLORIDE 100 MG/1
1 TABLET, FILM COATED ORAL DAILY
COMMUNITY
Start: 2017-02-22 | End: 2022-09-12

## 2018-02-19 RX ORDER — HYDROCODONE BITARTRATE AND ACETAMINOPHEN 5; 325 MG/1; MG/1
1 TABLET ORAL EVERY 4 HOURS PRN
COMMUNITY
Start: 2017-03-24 | End: 2022-09-12

## 2018-02-19 RX ORDER — ALBUTEROL SULFATE 90 UG/1
1-2 AEROSOL, METERED RESPIRATORY (INHALATION) EVERY 4 HOURS PRN
COMMUNITY
Start: 2014-09-26

## 2018-02-19 RX ORDER — ONDANSETRON 4 MG/1
1 TABLET, FILM COATED ORAL EVERY 4 HOURS PRN
COMMUNITY
Start: 2017-02-25 | End: 2022-09-12

## 2018-02-19 RX ORDER — CETIRIZINE HYDROCHLORIDE 10 MG/1
1 TABLET ORAL DAILY
COMMUNITY
Start: 2017-02-22 | End: 2022-09-12

## 2018-02-19 RX ORDER — DIVALPROEX SODIUM 500 MG/1
TABLET, EXTENDED RELEASE ORAL
COMMUNITY
Start: 2014-09-26 | End: 2022-09-12

## 2018-02-19 RX ORDER — MONTELUKAST SODIUM 10 MG/1
10 TABLET ORAL
COMMUNITY
Start: 2014-09-26 | End: 2022-09-12

## 2018-03-26 ENCOUNTER — HEALTH MAINTENANCE LETTER (OUTPATIENT)
Age: 28
End: 2018-03-26

## 2018-07-23 NOTE — PROGRESS NOTES
Patient Information     Patient Name  Antonette Sears MRN  [id number] Sex  Female   1990      Letter by Dominique Ibarra DO at      Author:  Dominique Ibarra DO Service:  (none) Author Type:  (none)    Filed:   Encounter Date:  3/24/2017 Status:  (Other)           Antonette Sears  [address] [place] [address]          2017      CERTIFICATE TO RETURN TO WORK OR SCHOOL      Antonette Sears has been under my care from 3/20/2017   through 3/24/2017.    She had a skin infection and underwent incision and drainage and is doing daily packing of the wound.  She needs to be off of work from - and than off of work until .   She cannot do any lifting and is an infection risk until this is completely healed.      She can go back to work on  with no restrictions.       Sincerely,  Dominique Ibarra DO

## 2018-07-23 NOTE — PROGRESS NOTES
Patient Information     Patient Name  Antonette Sears MRN  [id number] Sex  Female   1990      Letter by Dominique Ibarra DO at      Author:  Dominique Ibarra DO Service:  (none) Author Type:  (none)    Filed:   Encounter Date:  3/20/2017 Status:  (Other)           Antonette Sears  [address] [facility] [address]          2017      CERTIFICATE TO RETURN TO WORK OR SCHOOL      Antonette Sears has been seen in clinic on  3/20/2017 and is not able to work .      Remarks: re: medical     Sincerely,  Dominique Ibarra DO

## 2018-07-30 LAB — TSH SERPL-ACNC: 1.46 UIU/ML (ref 0.4–3.99)

## 2020-12-29 LAB — INR (EXTERNAL): 1.1

## 2020-12-30 LAB
CHOLESTEROL (EXTERNAL): 156 MG/DL (ref 100–199)
HDLC SERPL-MCNC: 43 MG/DL
LDL CHOLESTEROL (EXTERNAL): 86 MG/DL
NON HDL CHOLESTEROL (EXTERNAL): 113 MG/DL
TRIGLYCERIDES (EXTERNAL): 137 MG/DL

## 2021-01-15 LAB — HBA1C MFR BLD: 5.1 % (ref 4–5.6)

## 2022-09-12 ENCOUNTER — OFFICE VISIT (OUTPATIENT)
Dept: FAMILY MEDICINE | Facility: OTHER | Age: 32
End: 2022-09-12
Attending: FAMILY MEDICINE
Payer: COMMERCIAL

## 2022-09-12 VITALS
TEMPERATURE: 98.6 F | BODY MASS INDEX: 55.32 KG/M2 | HEIGHT: 61 IN | OXYGEN SATURATION: 98 % | HEART RATE: 84 BPM | DIASTOLIC BLOOD PRESSURE: 76 MMHG | WEIGHT: 293 LBS | RESPIRATION RATE: 18 BRPM | SYSTOLIC BLOOD PRESSURE: 132 MMHG

## 2022-09-12 DIAGNOSIS — F41.8 DEPRESSION WITH ANXIETY: ICD-10-CM

## 2022-09-12 DIAGNOSIS — J30.9 ALLERGIC RHINITIS, UNSPECIFIED SEASONALITY, UNSPECIFIED TRIGGER: Primary | ICD-10-CM

## 2022-09-12 PROBLEM — L02.411 CUTANEOUS ABSCESS OF RIGHT AXILLA: Status: RESOLVED | Noted: 2017-03-20 | Resolved: 2022-09-12

## 2022-09-12 LAB
ANION GAP SERPL CALCULATED.3IONS-SCNC: 8 MMOL/L (ref 3–14)
BUN SERPL-MCNC: 13 MG/DL (ref 7–25)
CALCIUM SERPL-MCNC: 9.3 MG/DL (ref 8.6–10.3)
CHLORIDE BLD-SCNC: 104 MMOL/L (ref 98–107)
CO2 SERPL-SCNC: 24 MMOL/L (ref 21–31)
CREAT SERPL-MCNC: 0.87 MG/DL (ref 0.6–1.2)
GFR SERPL CREATININE-BSD FRML MDRD: 89 ML/MIN/1.73M2
GLUCOSE BLD-MCNC: 83 MG/DL (ref 70–105)
HOLD SPECIMEN: NORMAL
POTASSIUM BLD-SCNC: 3.8 MMOL/L (ref 3.5–5.1)
SODIUM SERPL-SCNC: 136 MMOL/L (ref 134–144)

## 2022-09-12 PROCEDURE — 36415 COLL VENOUS BLD VENIPUNCTURE: CPT | Mod: ZL | Performed by: FAMILY MEDICINE

## 2022-09-12 PROCEDURE — 99203 OFFICE O/P NEW LOW 30 MIN: CPT | Performed by: FAMILY MEDICINE

## 2022-09-12 PROCEDURE — G0463 HOSPITAL OUTPT CLINIC VISIT: HCPCS

## 2022-09-12 PROCEDURE — 80048 BASIC METABOLIC PNL TOTAL CA: CPT | Mod: ZL | Performed by: FAMILY MEDICINE

## 2022-09-12 RX ORDER — CETIRIZINE HYDROCHLORIDE 10 MG/1
10 TABLET ORAL DAILY
Qty: 90 TABLET | Refills: 4 | Status: SHIPPED | OUTPATIENT
Start: 2022-09-12

## 2022-09-12 RX ORDER — SERTRALINE HYDROCHLORIDE 100 MG/1
100 TABLET, FILM COATED ORAL DAILY
Qty: 90 TABLET | Refills: 4 | Status: SHIPPED | OUTPATIENT
Start: 2022-09-12

## 2022-09-12 RX ORDER — BUSPIRONE HYDROCHLORIDE 10 MG/1
10 TABLET ORAL 3 TIMES DAILY
Qty: 270 TABLET | Refills: 4 | Status: SHIPPED | OUTPATIENT
Start: 2022-09-12

## 2022-09-12 ASSESSMENT — PATIENT HEALTH QUESTIONNAIRE - PHQ9
SUM OF ALL RESPONSES TO PHQ QUESTIONS 1-9: 0
5. POOR APPETITE OR OVEREATING: NEARLY EVERY DAY

## 2022-09-12 ASSESSMENT — PAIN SCALES - GENERAL: PAINLEVEL: NO PAIN (0)

## 2022-09-12 ASSESSMENT — ASTHMA QUESTIONNAIRES: ACT_TOTALSCORE: 22

## 2022-09-12 ASSESSMENT — ANXIETY QUESTIONNAIRES
2. NOT BEING ABLE TO STOP OR CONTROL WORRYING: NEARLY EVERY DAY
1. FEELING NERVOUS, ANXIOUS, OR ON EDGE: MORE THAN HALF THE DAYS
5. BEING SO RESTLESS THAT IT IS HARD TO SIT STILL: NEARLY EVERY DAY
IF YOU CHECKED OFF ANY PROBLEMS ON THIS QUESTIONNAIRE, HOW DIFFICULT HAVE THESE PROBLEMS MADE IT FOR YOU TO DO YOUR WORK, TAKE CARE OF THINGS AT HOME, OR GET ALONG WITH OTHER PEOPLE: VERY DIFFICULT
6. BECOMING EASILY ANNOYED OR IRRITABLE: NEARLY EVERY DAY
3. WORRYING TOO MUCH ABOUT DIFFERENT THINGS: NEARLY EVERY DAY
GAD7 TOTAL SCORE: 19
GAD7 TOTAL SCORE: 19
7. FEELING AFRAID AS IF SOMETHING AWFUL MIGHT HAPPEN: MORE THAN HALF THE DAYS

## 2022-09-12 NOTE — NURSING NOTE
Patient here to establish a provider and get medications refilled. Medication Reconciliation: complete.    Kasey Fuentes LPN  9/12/2022 8:21 AM

## 2022-09-12 NOTE — LETTER
September 13, 2022      Antonette Sears  [address] [facility] [address]        Dear ,    We are writing to inform you of your test results.    Your test results fall within the expected range(s) or remain unchanged from previous results.  Please continue with current treatment plan.    Resulted Orders   Basic Metabolic Panel   Result Value Ref Range    Sodium 136 134 - 144 mmol/L    Potassium 3.8 3.5 - 5.1 mmol/L    Chloride 104 98 - 107 mmol/L    Carbon Dioxide (CO2) 24 21 - 31 mmol/L    Anion Gap 8 3 - 14 mmol/L    Urea Nitrogen 13 7 - 25 mg/dL    Creatinine 0.87 0.60 - 1.20 mg/dL    Calcium 9.3 8.6 - 10.3 mg/dL    Glucose 83 70 - 105 mg/dL    GFR Estimate 89 >60 mL/min/1.73m2      Comment:      Effective December 21, 2021 eGFRcr in adults is calculated using the 2021 CKD-EPI creatinine equation which includes age and gender (Al et al., NEJM, DOI: 10.1056/WAGPzq9741888)       If you have any questions or concerns, please call the clinic at the number listed above.       Sincerely,      Damon Lua MD

## 2022-09-12 NOTE — LETTER
My Asthma Action Plan    Name: Antonette Sears   YOB: 1990  Date: 9/12/2022   My doctor: Damon Lua MD   My clinic: [facility]        My Rescue Medicine:   Albuterol inhaler (Proair/Ventolin/Proventil HFA)  2-4 puffs EVERY 4 HOURS as needed. Use a spacer if recommended by your provider.   My Asthma Severity:   Intermittent / Exercise Induced  Know your asthma triggers: animal dander, cold air and heat             GREEN ZONE   Good Control    I feel good    No cough or wheeze    Can work, sleep and play without asthma symptoms       Take your asthma control medicine every day.     1. If exercise triggers your asthma, take your rescue medication    15 minutes before exercise or sports, and    During exercise if you have asthma symptoms  2. Spacer to use with inhaler: If you have a spacer, make sure to use it with your inhaler             YELLOW ZONE Getting Worse  I have ANY of these:    I do not feel good    Cough or wheeze    Chest feels tight    Wake up at night   1. Keep taking your Green Zone medications  2. Start taking your rescue medicine:    every 20 minutes for up to 1 hour. Then every 4 hours for 24-48 hours.  3. If you stay in the Yellow Zone for more than 12-24 hours, contact your doctor.  4. If you do not return to the Green Zone in 12-24 hours or you get worse, start taking your oral steroid medicine if prescribed by your provider.           RED ZONE Medical Alert - Get Help  I have ANY of these:    I feel awful    Medicine is not helping    Breathing getting harder    Trouble walking or talking    Nose opens wide to breathe       1. Take your rescue medicine NOW  2. If your provider has prescribed an oral steroid medicine, start taking it NOW  3. Call your doctor NOW  4. If you are still in the Red Zone after 20 minutes and you have not reached your doctor:    Take your rescue medicine again and    Call 911 or go to the emergency room right away    See your  regular doctor within 2 weeks of an Emergency Room or Urgent Care visit for follow-up treatment.          Annual Reminders:  Meet with Asthma Educator,  Flu Shot in the Fall, consider Pneumonia Vaccination for patients with asthma (aged 19 and older).    Pharmacy: [id number] - [place], [place] - [address]    Electronically signed by Damon Lua MD   Date: 09/12/22                    Asthma Triggers  How To Control Things That Make Your Asthma Worse    Triggers are things that make your asthma worse.  Look at the list below to help you find your triggers and   what you can do about them. You can help prevent asthma flare-ups by staying away from your triggers.      Trigger                                                          What you can do   Cigarette Smoke  Tobacco smoke can make asthma worse. Do not allow smoking in your home, car or around you.  Be sure no one smokes at a child s day care or school.  If you smoke, ask your health care provider for ways to help you quit.  Ask family members to quit too.  Ask your health care provider for a referral to Quit Plan to help you quit smoking, or call 5-377-783-PLAN.     Colds, Flu, Bronchitis  These are common triggers of asthma. Wash your hands often.  Don t touch your eyes, nose or mouth.  Get a flu shot every year.     Dust Mites  These are tiny bugs that live in cloth or carpet. They are too small to see. Wash sheets and blankets in hot water every week.   Encase pillows and mattress in dust mite proof covers.  Avoid having carpet if you can. If you have carpet, vacuum weekly.   Use a dust mask and HEPA vacuum.   Pollen and Outdoor Mold  Some people are allergic to trees, grass, or weed pollen, or molds. Try to keep your windows closed.  Limit time out doors when pollen count is high.   Ask you health care provider about taking medicine during allergy season.     Animal Dander  Some people are allergic to skin  flakes, urine or saliva from pets with fur or feathers. Keep pets with fur or feathers out of your home.    If you can t keep the pet outdoors, then keep the pet out of your bedroom.  Keep the bedroom door closed.  Keep pets off cloth furniture and away from stuffed toys.     Mice, Rats, and Cockroaches  Some people are allergic to the waste from these pests.   Cover food and garbage.  Clean up spills and food crumbs.  Store grease in the refrigerator.   Keep food out of the bedroom.   Indoor Mold  This can be a trigger if your home has high moisture. Fix leaking faucets, pipes, or other sources of water.   Clean moldy surfaces.  Dehumidify basement if it is damp and smelly.   Smoke, Strong Odors, and Sprays  These can reduce air quality. Stay away from strong odors and sprays, such as perfume, powder, hair spray, paints, smoke incense, paint, cleaning products, candles and new carpet.   Exercise or Sports  Some people with asthma have this trigger. Be active!  Ask your doctor about taking medicine before sports or exercise to prevent symptoms.    Warm up for 5-10 minutes before and after sports or exercise.     Other Triggers of Asthma  Cold air:  Cover your nose and mouth with a scarf.  Sometimes laughing or crying can be a trigger.  Some medicines and food can trigger asthma.

## 2022-09-12 NOTE — PROGRESS NOTES
"  Assessment & Plan     Depression with anxiety  Restart.  Advised to start Zoloft at 50 mg increasing to 100 in a couple weeks restart the BuSpar after the Zoloft.  - sertraline (ZOLOFT) 100 MG tablet; Take 1 tablet (100 mg) by mouth daily  - busPIRone (BUSPAR) 10 MG tablet; Take 1 tablet (10 mg) by mouth 3 times daily  - Basic Metabolic Panel; Future    Allergic rhinitis, unspecified seasonality, unspecified trigger  Refill  - cetirizine (ZYRTEC) 10 MG tablet; Take 1 tablet (10 mg) by mouth daily             BMI:   Estimated body mass index is 56.64 kg/m  as calculated from the following:    Height as of this encounter: 1.556 m (5' 1.25\").    Weight as of this encounter: 137.1 kg (302 lb 3.2 oz).           No follow-ups on file.    Damon Lua MD  [facility]   Antonette is a 32 year old, presenting for the following health issues:  Recheck Medication (refills)      Patient arrives here for medication refill.  She has been off her Zoloft for a week and a half and BuSpar longer.  She states that she is feeling more anxiety depression.  She states when since stopping her Zoloft her head has been offkilter.  Her asthma has been under good control.  Patient is in need of a Pap smear and I did discuss this with her make an appointment in the near future             Review of Systems         Objective    /76   Pulse 84   Temp 98.6  F (37  C)   Resp 18   Ht 1.556 m (5' 1.25\")   Wt 137.1 kg (302 lb 3.2 oz)   SpO2 98%   BMI 56.64 kg/m    Body mass index is 56.64 kg/m .  Physical Exam  Constitutional:       Appearance: Normal appearance.   HENT:      Head: Normocephalic.   Cardiovascular:      Rate and Rhythm: Normal rate and regular rhythm.   Pulmonary:      Effort: Pulmonary effort is normal.      Breath sounds: Normal breath sounds.   Neurological:      Mental Status: She is alert.                            "

## 2022-10-12 ENCOUNTER — HOSPITAL ENCOUNTER (EMERGENCY)
Facility: OTHER | Age: 32
Discharge: LEFT BEFORE DISCHARGE | End: 2022-10-12
Admitting: FAMILY MEDICINE
Payer: MEDICAID

## 2022-10-12 VITALS
OXYGEN SATURATION: 100 % | HEART RATE: 82 BPM | DIASTOLIC BLOOD PRESSURE: 93 MMHG | BODY MASS INDEX: 55.32 KG/M2 | HEIGHT: 61 IN | TEMPERATURE: 98.7 F | RESPIRATION RATE: 15 BRPM | WEIGHT: 293 LBS | SYSTOLIC BLOOD PRESSURE: 145 MMHG

## 2022-10-12 LAB
FLUAV RNA SPEC QL NAA+PROBE: NEGATIVE
FLUBV RNA RESP QL NAA+PROBE: NEGATIVE
RSV RNA SPEC NAA+PROBE: NEGATIVE
SARS-COV-2 RNA RESP QL NAA+PROBE: NEGATIVE

## 2022-10-12 PROCEDURE — 87637 SARSCOV2&INF A&B&RSV AMP PRB: CPT | Performed by: FAMILY MEDICINE

## 2022-10-12 PROCEDURE — 99283 EMERGENCY DEPT VISIT LOW MDM: CPT | Mod: CS | Performed by: FAMILY MEDICINE

## 2022-10-12 PROCEDURE — C9803 HOPD COVID-19 SPEC COLLECT: HCPCS | Performed by: FAMILY MEDICINE

## 2022-10-13 ENCOUNTER — TRANSFERRED RECORDS (OUTPATIENT)
Dept: MULTI SPECIALTY CLINIC | Facility: CLINIC | Age: 32
End: 2022-10-13

## 2022-10-13 LAB
ALT SERPL-CCNC: 26 IU/L (ref 6–31)
AST SERPL-CCNC: 12 IU/L (ref 10–40)
CREATININE (EXTERNAL): 0.75 MG/DL (ref 0.4–1)
GLUCOSE (EXTERNAL): 116 MG/DL (ref 70–99)
POTASSIUM (EXTERNAL): 3.4 MEQ/L (ref 3.4–5.1)

## 2022-10-13 NOTE — ED TRIAGE NOTES
Pt states that she started vomiting excessively this morning.  Pt also reports diarrhea as well.  Pt has chills intermittently.  Pt denies any covid exposure.     Triage Assessment     Row Name 10/12/22 2029       Triage Assessment (Adult)    Airway WDL WDL       Respiratory WDL    Respiratory WDL WDL       Skin Circulation/Temperature WDL    Skin Circulation/Temperature WDL WDL       Cardiac WDL    Cardiac WDL WDL       Peripheral/Neurovascular WDL    Peripheral Neurovascular WDL WDL

## 2022-10-14 ENCOUNTER — HOSPITAL ENCOUNTER (EMERGENCY)
Facility: OTHER | Age: 32
Discharge: HOME | End: 2022-10-15
Attending: EMERGENCY MEDICINE | Admitting: EMERGENCY MEDICINE
Payer: MEDICAID

## 2022-10-14 DIAGNOSIS — B34.9 VIRAL SYNDROME: ICD-10-CM

## 2022-10-14 DIAGNOSIS — R11.10 VOMITING AND DIARRHEA: ICD-10-CM

## 2022-10-14 DIAGNOSIS — E87.6 HYPOKALEMIA: ICD-10-CM

## 2022-10-14 DIAGNOSIS — R19.7 VOMITING AND DIARRHEA: ICD-10-CM

## 2022-10-14 LAB
ALBUMIN SERPL-MCNC: 4.4 G/DL (ref 3.5–5.7)
ALBUMIN UR-MCNC: 30 MG/DL
ALP SERPL-CCNC: 40 U/L (ref 34–104)
ALT SERPL W P-5'-P-CCNC: 25 U/L (ref 7–52)
ANION GAP SERPL CALCULATED.3IONS-SCNC: 7 MMOL/L (ref 3–14)
APPEARANCE UR: CLEAR
AST SERPL W P-5'-P-CCNC: 15 U/L (ref 13–39)
BASOPHILS # BLD AUTO: 0 10E3/UL (ref 0–0.2)
BASOPHILS NFR BLD AUTO: 0 %
BILIRUB SERPL-MCNC: 0.7 MG/DL (ref 0.3–1)
BILIRUB UR QL STRIP: NEGATIVE
BUN SERPL-MCNC: 11 MG/DL (ref 7–25)
CALCIUM SERPL-MCNC: 9.5 MG/DL (ref 8.6–10.3)
CHLORIDE BLD-SCNC: 101 MMOL/L (ref 98–107)
CO2 SERPL-SCNC: 27 MMOL/L (ref 21–31)
COLOR UR AUTO: YELLOW
CREAT SERPL-MCNC: 0.82 MG/DL (ref 0.6–1.2)
CRP SERPL-MCNC: 1.6 MG/L
EOSINOPHIL # BLD AUTO: 0 10E3/UL (ref 0–0.7)
EOSINOPHIL NFR BLD AUTO: 0 %
ERYTHROCYTE [DISTWIDTH] IN BLOOD BY AUTOMATED COUNT: 14.4 % (ref 10–15)
ERYTHROCYTE [SEDIMENTATION RATE] IN BLOOD BY WESTERGREN METHOD: 16 MM/HR (ref 0–20)
FLUAV RNA SPEC QL NAA+PROBE: NEGATIVE
FLUBV RNA RESP QL NAA+PROBE: NEGATIVE
GFR SERPL CREATININE-BSD FRML MDRD: >90 ML/MIN/1.73M2
GLUCOSE BLD-MCNC: 90 MG/DL (ref 70–105)
GLUCOSE UR STRIP-MCNC: NEGATIVE MG/DL
HCG UR QL: NEGATIVE
HCT VFR BLD AUTO: 39.7 % (ref 35–47)
HGB BLD-MCNC: 13.7 G/DL (ref 11.7–15.7)
HGB UR QL STRIP: NEGATIVE
HOLD SPECIMEN: NORMAL
HOLD SPECIMEN: NORMAL
IMM GRANULOCYTES # BLD: 0.1 10E3/UL
IMM GRANULOCYTES NFR BLD: 1 %
KETONES UR STRIP-MCNC: 60 MG/DL
LACTATE SERPL-SCNC: 1 MMOL/L (ref 0.7–2)
LEUKOCYTE ESTERASE UR QL STRIP: NEGATIVE
LIPASE SERPL-CCNC: 58 U/L (ref 11–82)
LYMPHOCYTES # BLD AUTO: 2 10E3/UL (ref 0.8–5.3)
LYMPHOCYTES NFR BLD AUTO: 13 %
MCH RBC QN AUTO: 27 PG (ref 26.5–33)
MCHC RBC AUTO-ENTMCNC: 34.5 G/DL (ref 31.5–36.5)
MCV RBC AUTO: 78 FL (ref 78–100)
MONOCYTES # BLD AUTO: 1.2 10E3/UL (ref 0–1.3)
MONOCYTES NFR BLD AUTO: 8 %
MUCOUS THREADS #/AREA URNS LPF: PRESENT /LPF
NEUTROPHILS # BLD AUTO: 12.1 10E3/UL (ref 1.6–8.3)
NEUTROPHILS NFR BLD AUTO: 78 %
NITRATE UR QL: NEGATIVE
NRBC # BLD AUTO: 0 10E3/UL
NRBC BLD AUTO-RTO: 0 /100
PH UR STRIP: 6 [PH] (ref 5–9)
PLATELET # BLD AUTO: 260 10E3/UL (ref 150–450)
POTASSIUM BLD-SCNC: 3 MMOL/L (ref 3.5–5.1)
PROT SERPL-MCNC: 7.7 G/DL (ref 6.4–8.9)
RBC # BLD AUTO: 5.07 10E6/UL (ref 3.8–5.2)
RBC URINE: 2 /HPF
RSV RNA SPEC NAA+PROBE: NEGATIVE
SARS-COV-2 RNA RESP QL NAA+PROBE: NEGATIVE
SODIUM SERPL-SCNC: 135 MMOL/L (ref 134–144)
SP GR UR STRIP: 1.04 (ref 1–1.03)
SQUAMOUS EPITHELIAL: 5 /HPF
UROBILINOGEN UR STRIP-MCNC: 2 MG/DL
WBC # BLD AUTO: 15.4 10E3/UL (ref 4–11)
WBC URINE: 2 /HPF

## 2022-10-14 PROCEDURE — 99284 EMERGENCY DEPT VISIT MOD MDM: CPT | Mod: 25 | Performed by: EMERGENCY MEDICINE

## 2022-10-14 PROCEDURE — 81001 URINALYSIS AUTO W/SCOPE: CPT | Performed by: EMERGENCY MEDICINE

## 2022-10-14 PROCEDURE — 87637 SARSCOV2&INF A&B&RSV AMP PRB: CPT | Performed by: EMERGENCY MEDICINE

## 2022-10-14 PROCEDURE — 85025 COMPLETE CBC W/AUTO DIFF WBC: CPT | Performed by: EMERGENCY MEDICINE

## 2022-10-14 PROCEDURE — 87040 BLOOD CULTURE FOR BACTERIA: CPT | Performed by: EMERGENCY MEDICINE

## 2022-10-14 PROCEDURE — 83690 ASSAY OF LIPASE: CPT | Performed by: EMERGENCY MEDICINE

## 2022-10-14 PROCEDURE — 99284 EMERGENCY DEPT VISIT MOD MDM: CPT | Performed by: EMERGENCY MEDICINE

## 2022-10-14 PROCEDURE — 96365 THER/PROPH/DIAG IV INF INIT: CPT | Performed by: EMERGENCY MEDICINE

## 2022-10-14 PROCEDURE — 85652 RBC SED RATE AUTOMATED: CPT | Performed by: EMERGENCY MEDICINE

## 2022-10-14 PROCEDURE — 36415 COLL VENOUS BLD VENIPUNCTURE: CPT | Performed by: EMERGENCY MEDICINE

## 2022-10-14 PROCEDURE — 83605 ASSAY OF LACTIC ACID: CPT | Performed by: EMERGENCY MEDICINE

## 2022-10-14 PROCEDURE — 86140 C-REACTIVE PROTEIN: CPT | Performed by: EMERGENCY MEDICINE

## 2022-10-14 PROCEDURE — 250N000013 HC RX MED GY IP 250 OP 250 PS 637: Performed by: EMERGENCY MEDICINE

## 2022-10-14 PROCEDURE — 96375 TX/PRO/DX INJ NEW DRUG ADDON: CPT | Performed by: EMERGENCY MEDICINE

## 2022-10-14 PROCEDURE — C9803 HOPD COVID-19 SPEC COLLECT: HCPCS | Performed by: EMERGENCY MEDICINE

## 2022-10-14 PROCEDURE — 81025 URINE PREGNANCY TEST: CPT | Performed by: EMERGENCY MEDICINE

## 2022-10-14 PROCEDURE — 250N000011 HC RX IP 250 OP 636: Performed by: EMERGENCY MEDICINE

## 2022-10-14 PROCEDURE — 80053 COMPREHEN METABOLIC PANEL: CPT | Performed by: EMERGENCY MEDICINE

## 2022-10-14 RX ORDER — POTASSIUM CHLORIDE 7.45 MG/ML
10 INJECTION INTRAVENOUS
Status: ACTIVE | OUTPATIENT
Start: 2022-10-15 | End: 2022-10-15

## 2022-10-14 RX ORDER — ACETAMINOPHEN 325 MG/1
650 TABLET ORAL ONCE
Status: COMPLETED | OUTPATIENT
Start: 2022-10-14 | End: 2022-10-14

## 2022-10-14 RX ORDER — SODIUM CHLORIDE 9 MG/ML
INJECTION, SOLUTION INTRAVENOUS CONTINUOUS
Status: DISCONTINUED | OUTPATIENT
Start: 2022-10-15 | End: 2022-10-15 | Stop reason: HOSPADM

## 2022-10-14 RX ORDER — ONDANSETRON 4 MG/1
4 TABLET, ORALLY DISINTEGRATING ORAL ONCE
Status: COMPLETED | OUTPATIENT
Start: 2022-10-14 | End: 2022-10-14

## 2022-10-14 RX ORDER — METOCLOPRAMIDE HYDROCHLORIDE 5 MG/ML
10 INJECTION INTRAMUSCULAR; INTRAVENOUS ONCE
Status: COMPLETED | OUTPATIENT
Start: 2022-10-14 | End: 2022-10-15

## 2022-10-14 RX ADMIN — ONDANSETRON 4 MG: 4 TABLET, ORALLY DISINTEGRATING ORAL at 22:19

## 2022-10-14 RX ADMIN — ACETAMINOPHEN 650 MG: 325 TABLET, FILM COATED ORAL at 22:17

## 2022-10-14 ASSESSMENT — ENCOUNTER SYMPTOMS
MUSCULOSKELETAL NEGATIVE: 1
HEMATOLOGIC/LYMPHATIC NEGATIVE: 1
FEVER: 1
NECK PAIN: 0
PSYCHIATRIC NEGATIVE: 1
PHOTOPHOBIA: 0
EYES NEGATIVE: 1
GASTROINTESTINAL NEGATIVE: 1
TREMORS: 0
WEAKNESS: 0
NEUROLOGICAL NEGATIVE: 1
ENDOCRINE NEGATIVE: 1
CHILLS: 0
SHORTNESS OF BREATH: 0
RESPIRATORY NEGATIVE: 1
CARDIOVASCULAR NEGATIVE: 1
NECK STIFFNESS: 0
ALLERGIC/IMMUNOLOGIC NEGATIVE: 1

## 2022-10-14 ASSESSMENT — ACTIVITIES OF DAILY LIVING (ADL): ADLS_ACUITY_SCORE: 35

## 2022-10-15 ENCOUNTER — ANESTHESIA (OUTPATIENT)
Dept: EMERGENCY MEDICINE | Facility: OTHER | Age: 32
End: 2022-10-15
Payer: MEDICAID

## 2022-10-15 ENCOUNTER — ANESTHESIA EVENT (OUTPATIENT)
Dept: EMERGENCY MEDICINE | Facility: OTHER | Age: 32
End: 2022-10-15
Payer: MEDICAID

## 2022-10-15 VITALS
TEMPERATURE: 99.4 F | HEART RATE: 72 BPM | WEIGHT: 293 LBS | RESPIRATION RATE: 18 BRPM | DIASTOLIC BLOOD PRESSURE: 94 MMHG | OXYGEN SATURATION: 98 % | BODY MASS INDEX: 55.32 KG/M2 | SYSTOLIC BLOOD PRESSURE: 152 MMHG | HEIGHT: 61 IN

## 2022-10-15 LAB — POTASSIUM BLD-SCNC: 3.5 MMOL/L (ref 3.5–5.1)

## 2022-10-15 PROCEDURE — C9113 INJ PANTOPRAZOLE SODIUM, VIA: HCPCS | Performed by: EMERGENCY MEDICINE

## 2022-10-15 PROCEDURE — 250N000011 HC RX IP 250 OP 636: Performed by: EMERGENCY MEDICINE

## 2022-10-15 PROCEDURE — 36410 VNPNXR 3YR/> PHY/QHP DX/THER: CPT | Performed by: NURSE ANESTHETIST, CERTIFIED REGISTERED

## 2022-10-15 PROCEDURE — 84132 ASSAY OF SERUM POTASSIUM: CPT | Performed by: EMERGENCY MEDICINE

## 2022-10-15 PROCEDURE — 250N000013 HC RX MED GY IP 250 OP 250 PS 637: Performed by: EMERGENCY MEDICINE

## 2022-10-15 PROCEDURE — 258N000003 HC RX IP 258 OP 636: Performed by: EMERGENCY MEDICINE

## 2022-10-15 PROCEDURE — 36416 COLLJ CAPILLARY BLOOD SPEC: CPT | Performed by: EMERGENCY MEDICINE

## 2022-10-15 PROCEDURE — 250N000009 HC RX 250: Performed by: EMERGENCY MEDICINE

## 2022-10-15 RX ORDER — MAGNESIUM HYDROXIDE/ALUMINUM HYDROXICE/SIMETHICONE 120; 1200; 1200 MG/30ML; MG/30ML; MG/30ML
15 SUSPENSION ORAL ONCE
Status: COMPLETED | OUTPATIENT
Start: 2022-10-15 | End: 2022-10-15

## 2022-10-15 RX ORDER — POTASSIUM CHLORIDE 1500 MG/1
40 TABLET, EXTENDED RELEASE ORAL ONCE
Status: DISCONTINUED | OUTPATIENT
Start: 2022-10-15 | End: 2022-10-15

## 2022-10-15 RX ORDER — POTASSIUM CHLORIDE 20MEQ/15ML
40 LIQUID (ML) ORAL ONCE
Status: COMPLETED | OUTPATIENT
Start: 2022-10-15 | End: 2022-10-15

## 2022-10-15 RX ORDER — LIDOCAINE HYDROCHLORIDE 20 MG/ML
15 SOLUTION OROPHARYNGEAL ONCE
Status: COMPLETED | OUTPATIENT
Start: 2022-10-15 | End: 2022-10-15

## 2022-10-15 RX ADMIN — SODIUM CHLORIDE: 9 INJECTION, SOLUTION INTRAVENOUS at 03:27

## 2022-10-15 RX ADMIN — METOCLOPRAMIDE HYDROCHLORIDE 10 MG: 5 INJECTION INTRAMUSCULAR; INTRAVENOUS at 01:42

## 2022-10-15 RX ADMIN — ALUMINA, MAGNESIA, AND SIMETHICONE ORAL SUSPENSION REGULAR STRENGTH 15 ML: 1200; 1200; 120 SUSPENSION ORAL at 02:03

## 2022-10-15 RX ADMIN — SODIUM CHLORIDE 1000 ML: 9 INJECTION, SOLUTION INTRAVENOUS at 01:45

## 2022-10-15 RX ADMIN — PANTOPRAZOLE SODIUM 20 MG: 40 INJECTION, POWDER, FOR SOLUTION INTRAVENOUS at 05:22

## 2022-10-15 RX ADMIN — POTASSIUM CHLORIDE 40 MEQ: 1.5 SOLUTION ORAL at 02:43

## 2022-10-15 RX ADMIN — POTASSIUM CHLORIDE 10 MEQ: 7.46 INJECTION, SOLUTION INTRAVENOUS at 01:46

## 2022-10-15 RX ADMIN — LIDOCAINE HYDROCHLORIDE 15 ML: 20 SOLUTION ORAL; TOPICAL at 02:03

## 2022-10-15 ASSESSMENT — ACTIVITIES OF DAILY LIVING (ADL)
ADLS_ACUITY_SCORE: 35

## 2022-10-15 NOTE — ED TRIAGE NOTES
Pt comes into the ER today reporting vomiting for 4 days. She has some upper abdominal pain. She was seen in [place] yesterday and given a full work up and was sent home and was told it was gastroenteritis. No fever then. She felt better when he had left and this morning it came back again. Diarrhea for 4 days, less today. No recent sick contacts. History gall bladder removal and csection,.   IUD, no pregnancy.   Fever in triage. Sweating yesterday non today.    Triage Assessment     Row Name 10/14/22 2110       Triage Assessment (Adult)    Airway WDL WDL       Respiratory WDL    Respiratory WDL WDL       Skin Circulation/Temperature WDL    Skin Circulation/Temperature WDL WDL       Cardiac WDL    Cardiac WDL WDL       Peripheral/Neurovascular WDL    Peripheral Neurovascular WDL WDL       Cognitive/Neuro/Behavioral WDL    Cognitive/Neuro/Behavioral WDL WDL       Oxford Coma Scale    Best Eye Response 4-->(E4) spontaneous    Best Motor Response 6-->(M6) obeys commands    Best Verbal Response 5-->(V5) oriented    Fletcher Coma Scale Score 15

## 2022-10-15 NOTE — ED PROVIDER NOTES
History     Chief Complaint   Patient presents with     Vomiting     HPI  Antonette Sears is a 32 year old female with complaints of vomiting.  Symptoms began on Tuesday. Sx initially accompanied by multiple bouts of diarrhea which has slowly decreased.  Patient seen in [place] where she had a work-up including CT of the chest and CT of the abdomen which showed no acute definitive diagnosis.  Symptoms have persisted prompting ER visit.  Patient has otherwise been in his baseline state of health.  Denies any recent travel outside the country.  No ill contacts.    Allergies:  Allergies   Allergen Reactions     Codeine Nausea and Vomiting       Problem List:    Patient Active Problem List    Diagnosis Date Noted     Depression with anxiety 09/12/2022     Priority: Medium     Allergic rhinitis 02/19/2018     Priority: Medium     Asthma 02/19/2018     Priority: Medium     Esophageal reflux 02/19/2018     Priority: Medium     Migraine headache 02/19/2018     Priority: Medium     Unspecified disorder of menstruation and other abnormal bleeding from female genital tract 06/01/2012     Priority: Medium     Contraceptive management 07/06/2011     Priority: Medium        Past Medical History:    Past Medical History:   Diagnosis Date     Cervicalgia        Past Surgical History:    Past Surgical History:   Procedure Laterality Date     CHOLECYSTECTOMY      Age 16       Family History:    No family history on file.    Social History:  Marital Status:  Single [1]  Social History     Tobacco Use     Smoking status: Never     Smokeless tobacco: Never   Vaping Use     Vaping Use: Never used   Substance Use Topics     Alcohol use: No     Drug use: Yes     Frequency: 7.0 times per week     Types: Other, Marijuana     Comment: daily use        Medications:    albuterol (PROAIR HFA/PROVENTIL HFA/VENTOLIN HFA) 108 (90 Base) MCG/ACT inhaler  busPIRone (BUSPAR) 10 MG tablet  cetirizine (ZYRTEC) 10 MG tablet  levonorgestrel (MIRENA) 20  "MCG/DAY IUD  omeprazole (PRILOSEC) 20 MG CR capsule  sertraline (ZOLOFT) 100 MG tablet          Review of Systems   Constitutional: Positive for fever. Negative for chills.   HENT: Negative.    Eyes: Negative.  Negative for photophobia.   Respiratory: Negative.  Negative for shortness of breath.    Cardiovascular: Negative.    Gastrointestinal: Negative.    Endocrine: Negative.    Genitourinary: Negative.    Musculoskeletal: Negative.  Negative for neck pain and neck stiffness.   Skin: Negative.    Allergic/Immunologic: Negative.    Neurological: Negative.  Negative for tremors, syncope and weakness.   Hematological: Negative.    Psychiatric/Behavioral: Negative.        Physical Exam   BP: (!) 145/94  Pulse: 80  Temp: (!) 101.3  F (38.5  C)  Resp: 18  Height: 154.9 cm (5' 1\")  Weight: 136.1 kg (300 lb)  SpO2: 96 %      Physical Exam  Constitutional:       General: She is not in acute distress.     Appearance: Normal appearance. She is normal weight. She is not toxic-appearing.   HENT:      Head: Normocephalic and atraumatic.   Cardiovascular:      Rate and Rhythm: Normal rate and regular rhythm.      Pulses: Normal pulses.   Pulmonary:      Effort: Pulmonary effort is normal.   Abdominal:      General: Abdomen is flat.   Musculoskeletal:         General: Normal range of motion.      Cervical back: Normal range of motion and neck supple. No rigidity.      Right lower leg: No edema.      Left lower leg: No edema.   Lymphadenopathy:      Cervical: No cervical adenopathy.   Skin:     General: Skin is warm.      Capillary Refill: Capillary refill takes less than 2 seconds.   Neurological:      General: No focal deficit present.      Mental Status: She is alert.   Psychiatric:         Mood and Affect: Mood normal.         Behavior: Behavior normal.         ED Course              ED Course as of 10/15/22 0624   Sat Oct 15, 2022   0551 Patient feeling better.  Repeat potassium normal.  Presumed viral syndrome but etiology " of symptoms not entirely clear.  Patient is going to be discharged home with follow-up with primary care doctor next week.     Procedures                Results for orders placed or performed during the hospital encounter of 10/14/22 (from the past 24 hour(s))   CBC with platelets differential    Narrative    The following orders were created for panel order CBC with platelets differential.  Procedure                               Abnormality         Status                     ---------                               -----------         ------                     CBC with platelets and d...[[id number]]  Abnormal            Final result                 Please view results for these tests on the individual orders.   Comprehensive metabolic panel   Result Value Ref Range    Sodium 135 134 - 144 mmol/L    Potassium 3.0 (L) 3.5 - 5.1 mmol/L    Chloride 101 98 - 107 mmol/L    Carbon Dioxide (CO2) 27 21 - 31 mmol/L    Anion Gap 7 3 - 14 mmol/L    Urea Nitrogen 11 7 - 25 mg/dL    Creatinine 0.82 0.60 - 1.20 mg/dL    Calcium 9.5 8.6 - 10.3 mg/dL    Glucose 90 70 - 105 mg/dL    Alkaline Phosphatase 40 34 - 104 U/L    AST 15 13 - 39 U/L    ALT 25 7 - 52 U/L    Protein Total 7.7 6.4 - 8.9 g/dL    Albumin 4.4 3.5 - 5.7 g/dL    Bilirubin Total 0.7 0.3 - 1.0 mg/dL    GFR Estimate >90 >60 mL/min/1.73m2   Lactic acid whole blood   Result Value Ref Range    Lactic Acid 1.0 0.7 - 2.0 mmol/L   Lipase   Result Value Ref Range    Lipase 58 11 - 82 U/L   CRP inflammation   Result Value Ref Range    CRP Inflammation 1.6 <10.0 mg/L   CBC with platelets and differential   Result Value Ref Range    WBC Count 15.4 (H) 4.0 - 11.0 10e3/uL    RBC Count 5.07 3.80 - 5.20 10e6/uL    Hemoglobin 13.7 11.7 - 15.7 g/dL    Hematocrit 39.7 35.0 - 47.0 %    MCV 78 78 - 100 fL    MCH 27.0 26.5 - 33.0 pg    MCHC 34.5 31.5 - 36.5 g/dL    RDW 14.4 10.0 - 15.0 %    Platelet Count 260 150 - 450 10e3/uL    % Neutrophils 78 %    % Lymphocytes 13 %    % Monocytes 8 %     % Eosinophils 0 %    % Basophils 0 %    % Immature Granulocytes 1 %    NRBCs per 100 WBC 0 <1 /100    Absolute Neutrophils 12.1 (H) 1.6 - 8.3 10e3/uL    Absolute Lymphocytes 2.0 0.8 - 5.3 10e3/uL    Absolute Monocytes 1.2 0.0 - 1.3 10e3/uL    Absolute Eosinophils 0.0 0.0 - 0.7 10e3/uL    Absolute Basophils 0.0 0.0 - 0.2 10e3/uL    Absolute Immature Granulocytes 0.1 <=0.4 10e3/uL    Absolute NRBCs 0.0 10e3/uL   Erythrocyte sedimentation rate auto   Result Value Ref Range    Erythrocyte Sedimentation Rate 16 0 - 20 mm/hr   Extra Tube    Narrative    The following orders were created for panel order Extra Tube.  Procedure                               Abnormality         Status                     ---------                               -----------         ------                     Extra Blue Top Tube[[id number]]                              Final result               Extra Serum Separator Tu...[[id number]]                      Final result                 Please view results for these tests on the individual orders.   Extra Blue Top Tube   Result Value Ref Range    Hold Specimen Hold Specimen    Extra Serum Separator Tube (SST)   Result Value Ref Range    Hold Specimen Hold Specimen    Symptomatic; Unknown Influenza A/B & SARS-CoV2 (COVID-19) Virus PCR Multiplex Nose    Specimen: Nose; Swab   Result Value Ref Range    Influenza A PCR Negative Negative    Influenza B PCR Negative Negative    RSV PCR Negative Negative    SARS CoV2 PCR Negative Negative    Narrative    Testing was performed using the Xpert Xpress CoV2/Flu/RSV Assay on the [employer] GeneXpert Instrument. This test should be ordered for the detection of SARS-CoV-2 and influenza viruses in individuals who meet clinical and/or epidemiological criteria. Test performance is unknown in asymptomatic patients. This test is for in vitro diagnostic use under the FDA EUA for laboratories certified under CLIA to perform high or moderate complexity testing. This test  has not been FDA cleared or approved. A negative result does not rule out the presence of PCR inhibitors in the specimen or target RNA in concentration below the limit of detection for the assay. If only one viral target is positive but coinfection with multiple targets is suspected, the sample should be re-tested with another FDA cleared, approved, or authorized test, if coinfection would change clinical management. This test was validated by the [facility]. These laboratories are certified under the Clinical Laboratory Improvement Amendments of 1988 (CLIA-88) as qualified to perform high complexity laboratory testing.   HCG qualitative urine   Result Value Ref Range    hCG Urine Qualitative Negative Negative   UA with Microscopic reflex to Culture    Specimen: Urine, Clean Catch   Result Value Ref Range    Color Urine Yellow Colorless, Straw, Light Yellow, Yellow    Appearance Urine Clear Clear    Glucose Urine Negative Negative mg/dL    Bilirubin Urine Negative Negative    Ketones Urine 60 (A) Negative mg/dL    Specific Gravity Urine 1.038 (H) 1.000 - 1.030    Blood Urine Negative Negative    pH Urine 6.0 5.0 - 9.0    Protein Albumin Urine 30 (A) Negative mg/dL    Urobilinogen Urine 2.0 Normal, 2.0 mg/dL    Nitrite Urine Negative Negative    Leukocyte Esterase Urine Negative Negative    Mucus Urine Present (A) None Seen /LPF    RBC Urine 2 <=2 /HPF    WBC Urine 2 <=5 /HPF    Squamous Epithelials Urine 5 (H) <=1 /HPF    Narrative    Urine Culture not indicated   Potassium   Result Value Ref Range    Potassium 3.5 3.5 - 5.1 mmol/L       Medications   acetaminophen (TYLENOL) tablet 650 mg (has no administration in time range)       Assessments & Plan (with Medical Decision Making)     32-year-old female who presents today with complaints of vomiting.  Seen yesterday at [place] and had extensive work-up including CT of the chest and abdomen. Patient states that in addition to vomiting she  developed a fever.      Work-up here included labs and notable findings included white blood cell count of 15 which is decreased from 19 from yesterday.  Low potassium at 3.0, negative COVID.  Patient treated with supportively with IV fluids and had a potassium replaced.  Patient also given antiemetics.  Patient observed for several hours during which potassium rechecked and now corrected.  Patient feeling better at time of discharge.      Etiology of symptoms not entirely clear.  Presumed viral syndrome and possible gastroenteritis. Patient is going to be discharged home and has been instructed to have her potassium level rechecked in 24 to 48 hours.  Blood pressure to be rechecked in one week as it was slightly elevated here. Patient understands to return if she develops any new or worsening symptoms.  Has Zofran to take at home as needed.    New Prescriptions    No medications on file       Final diagnoses:   Hypokalemia   Vomiting and diarrhea   Viral syndrome       10/14/2022   [facility]     Robert Rodriguez MD  10/16/22 0417

## 2022-10-15 NOTE — DISCHARGE INSTRUCTIONS
1) Follow the aftercare instructions provided.  2) Follow up with your doctor next week.   3) Your blood pressure must be rechecked next week.  4) Return to the ER if vomiting persists  5) Your potassium level must be rechecked in 24 to 48 hours. Go straight to the labs to have it rechecked and follow up with your doctor or in the urgent care.

## 2022-10-16 ENCOUNTER — TELEPHONE (OUTPATIENT)
Dept: FAMILY MEDICINE | Facility: OTHER | Age: 32
End: 2022-10-16

## 2022-10-16 ENCOUNTER — LAB (OUTPATIENT)
Dept: LAB | Facility: OTHER | Age: 32
End: 2022-10-16
Attending: FAMILY MEDICINE
Payer: MEDICAID

## 2022-10-16 DIAGNOSIS — E87.6 HYPOKALEMIA: ICD-10-CM

## 2022-10-16 LAB — POTASSIUM BLD-SCNC: 3.3 MMOL/L (ref 3.5–5.1)

## 2022-10-16 PROCEDURE — 84132 ASSAY OF SERUM POTASSIUM: CPT | Mod: ZL

## 2022-10-16 PROCEDURE — 36415 COLL VENOUS BLD VENIPUNCTURE: CPT | Mod: ZL

## 2022-10-16 NOTE — TELEPHONE ENCOUNTER
After proper verification- message read back to patient, no further questions  Patricia Boswell LPN.......10/16/2022 10:10 AM

## 2022-10-16 NOTE — TELEPHONE ENCOUNTER
Please call patient.   Potassium 3.3 (3.5 yesterday)   This is not in the range where I would recommend supplementation. Focus on protein rich foods (bananas are good).     If symptoms persist (diarrhea) then should be rechecked in the next week or so.     If symptoms resolve, then will likely normalize and can be rechecked at next regular visit with PCP.     Luisa Marie MD
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
## 2022-10-18 ENCOUNTER — OFFICE VISIT (OUTPATIENT)
Dept: FAMILY MEDICINE | Facility: OTHER | Age: 32
End: 2022-10-18
Attending: PHYSICIAN ASSISTANT
Payer: MEDICAID

## 2022-10-18 VITALS
HEART RATE: 82 BPM | SYSTOLIC BLOOD PRESSURE: 130 MMHG | WEIGHT: 290.4 LBS | HEIGHT: 61 IN | RESPIRATION RATE: 12 BRPM | TEMPERATURE: 96.6 F | OXYGEN SATURATION: 98 % | BODY MASS INDEX: 54.83 KG/M2 | DIASTOLIC BLOOD PRESSURE: 82 MMHG

## 2022-10-18 DIAGNOSIS — R11.10 VOMITING AND DIARRHEA: ICD-10-CM

## 2022-10-18 DIAGNOSIS — K21.9 GASTROESOPHAGEAL REFLUX DISEASE WITHOUT ESOPHAGITIS: ICD-10-CM

## 2022-10-18 DIAGNOSIS — R19.7 VOMITING AND DIARRHEA: ICD-10-CM

## 2022-10-18 DIAGNOSIS — E87.6 HYPOKALEMIA: Primary | ICD-10-CM

## 2022-10-18 LAB
HOLD SPECIMEN: NORMAL
HOLD SPECIMEN: NORMAL
POTASSIUM BLD-SCNC: 3.4 MMOL/L (ref 3.5–5.1)

## 2022-10-18 PROCEDURE — 84132 ASSAY OF SERUM POTASSIUM: CPT | Mod: ZL | Performed by: PHYSICIAN ASSISTANT

## 2022-10-18 PROCEDURE — G0463 HOSPITAL OUTPT CLINIC VISIT: HCPCS

## 2022-10-18 PROCEDURE — 99213 OFFICE O/P EST LOW 20 MIN: CPT | Performed by: PHYSICIAN ASSISTANT

## 2022-10-18 PROCEDURE — 36415 COLL VENOUS BLD VENIPUNCTURE: CPT | Mod: ZL | Performed by: PHYSICIAN ASSISTANT

## 2022-10-18 ASSESSMENT — PAIN SCALES - GENERAL: PAINLEVEL: NO PAIN (0)

## 2022-10-18 NOTE — NURSING NOTE
Patient presents to clinic for ER follow up. Patient states that she is feeling better.  Melia Gifford LPN ....................  10/18/2022   9:01 AM

## 2022-10-20 LAB
BACTERIA BLD CULT: NO GROWTH
BACTERIA BLD CULT: NO GROWTH

## 2022-10-26 ENCOUNTER — TELEPHONE (OUTPATIENT)
Dept: FAMILY MEDICINE | Facility: OTHER | Age: 32
End: 2022-10-26

## 2022-10-26 NOTE — TELEPHONE ENCOUNTER
Patient has started to vomit again yesterday.    Wondering what she should do?    Please call patient back   Hien Loza on 10/26/2022 at 10:12 AM

## 2022-10-26 NOTE — TELEPHONE ENCOUNTER
Notified patient of providers note. She states that she hasn't vomited since yesterday and thinks that her acid reflux medication needs to be changed. Transferred to scheduling to make appointment.  Melia Gifford LPN ....................  10/26/2022   10:47 AM

## 2022-10-26 NOTE — TELEPHONE ENCOUNTER
Can be seen in the [facility] or with any available provider today.     Vane Tyler PA-C on 10/26/2022 at 10:25 AM
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
## 2022-11-08 ENCOUNTER — OFFICE VISIT (OUTPATIENT)
Dept: FAMILY MEDICINE | Facility: OTHER | Age: 32
End: 2022-11-08
Attending: PHYSICIAN ASSISTANT
Payer: COMMERCIAL

## 2022-11-08 VITALS
SYSTOLIC BLOOD PRESSURE: 132 MMHG | BODY MASS INDEX: 55.32 KG/M2 | DIASTOLIC BLOOD PRESSURE: 84 MMHG | WEIGHT: 293 LBS | OXYGEN SATURATION: 96 % | HEART RATE: 84 BPM | RESPIRATION RATE: 14 BRPM | TEMPERATURE: 97.6 F | HEIGHT: 61 IN

## 2022-11-08 DIAGNOSIS — K21.9 GASTROESOPHAGEAL REFLUX DISEASE WITHOUT ESOPHAGITIS: Primary | ICD-10-CM

## 2022-11-08 PROCEDURE — G0463 HOSPITAL OUTPT CLINIC VISIT: HCPCS

## 2022-11-08 PROCEDURE — 99214 OFFICE O/P EST MOD 30 MIN: CPT | Performed by: PHYSICIAN ASSISTANT

## 2022-11-08 ASSESSMENT — PAIN SCALES - GENERAL: PAINLEVEL: MODERATE PAIN (4)

## 2022-11-08 NOTE — NURSING NOTE
Patient presents to clinic to discuss increasing dose of Prilosec.  Melia Gifford LPN ....................  11/8/2022   7:52 AM

## 2023-01-27 ENCOUNTER — HOSPITAL ENCOUNTER (EMERGENCY)
Facility: OTHER | Age: 33
Discharge: HOME | End: 2023-01-28
Attending: INTERNAL MEDICINE | Admitting: INTERNAL MEDICINE
Payer: COMMERCIAL

## 2023-01-27 VITALS
OXYGEN SATURATION: 99 % | RESPIRATION RATE: 16 BRPM | HEART RATE: 75 BPM | TEMPERATURE: 99.8 F | WEIGHT: 289 LBS | HEIGHT: 61 IN | SYSTOLIC BLOOD PRESSURE: 166 MMHG | DIASTOLIC BLOOD PRESSURE: 97 MMHG | BODY MASS INDEX: 54.56 KG/M2

## 2023-01-27 DIAGNOSIS — R11.15 CYCLICAL VOMITING: Primary | ICD-10-CM

## 2023-01-27 PROBLEM — R11.0 NAUSEA: Status: ACTIVE | Noted: 2020-12-29

## 2023-01-27 LAB
ALBUMIN SERPL BCG-MCNC: 4.2 G/DL (ref 3.5–5.2)
ALBUMIN UR-MCNC: 30 MG/DL
ALP SERPL-CCNC: 54 U/L (ref 35–104)
ALT SERPL W P-5'-P-CCNC: 37 U/L (ref 10–35)
ANION GAP SERPL CALCULATED.3IONS-SCNC: 15 MMOL/L (ref 7–15)
APPEARANCE UR: CLEAR
AST SERPL W P-5'-P-CCNC: 25 U/L (ref 10–35)
BACTERIA #/AREA URNS HPF: ABNORMAL /HPF
BASE EXCESS BLDV CALC-SCNC: -0.1 MMOL/L (ref -7.7–1.9)
BASOPHILS # BLD AUTO: 0.1 10E3/UL (ref 0–0.2)
BASOPHILS NFR BLD AUTO: 0 %
BILIRUB SERPL-MCNC: 0.6 MG/DL
BILIRUB UR QL STRIP: NEGATIVE
BUN SERPL-MCNC: 15.5 MG/DL (ref 6–20)
CALCIUM SERPL-MCNC: 9.2 MG/DL (ref 8.6–10)
CHLORIDE SERPL-SCNC: 102 MMOL/L (ref 98–107)
COLOR UR AUTO: YELLOW
CREAT SERPL-MCNC: 0.83 MG/DL (ref 0.51–0.95)
DEPRECATED HCO3 PLAS-SCNC: 20 MMOL/L (ref 22–29)
EOSINOPHIL # BLD AUTO: 0 10E3/UL (ref 0–0.7)
EOSINOPHIL NFR BLD AUTO: 0 %
ERYTHROCYTE [DISTWIDTH] IN BLOOD BY AUTOMATED COUNT: 14.2 % (ref 10–15)
GFR SERPL CREATININE-BSD FRML MDRD: >90 ML/MIN/1.73M2
GLUCOSE SERPL-MCNC: 124 MG/DL (ref 70–99)
GLUCOSE UR STRIP-MCNC: NEGATIVE MG/DL
HCO3 BLDV-SCNC: 22 MMOL/L (ref 21–28)
HCT VFR BLD AUTO: 39.8 % (ref 35–47)
HGB BLD-MCNC: 13.6 G/DL (ref 11.7–15.7)
HGB UR QL STRIP: NEGATIVE
HOLD SPECIMEN: NORMAL
IMM GRANULOCYTES # BLD: 0.1 10E3/UL
IMM GRANULOCYTES NFR BLD: 1 %
KETONES UR STRIP-MCNC: 60 MG/DL
LEUKOCYTE ESTERASE UR QL STRIP: NEGATIVE
LIPASE SERPL-CCNC: 32 U/L (ref 13–60)
LYMPHOCYTES # BLD AUTO: 1.6 10E3/UL (ref 0.8–5.3)
LYMPHOCYTES NFR BLD AUTO: 10 %
MAGNESIUM SERPL-MCNC: 1.7 MG/DL (ref 1.7–2.3)
MCH RBC QN AUTO: 26.5 PG (ref 26.5–33)
MCHC RBC AUTO-ENTMCNC: 34.2 G/DL (ref 31.5–36.5)
MCV RBC AUTO: 78 FL (ref 78–100)
MONOCYTES # BLD AUTO: 0.7 10E3/UL (ref 0–1.3)
MONOCYTES NFR BLD AUTO: 4 %
MUCOUS THREADS #/AREA URNS LPF: PRESENT /LPF
NEUTROPHILS # BLD AUTO: 14 10E3/UL (ref 1.6–8.3)
NEUTROPHILS NFR BLD AUTO: 85 %
NITRATE UR QL: NEGATIVE
NRBC # BLD AUTO: 0 10E3/UL
NRBC BLD AUTO-RTO: 0 /100
O2/TOTAL GAS SETTING VFR VENT: 0 %
OXYHGB MFR BLDV: 78 % (ref 70–75)
PCO2 BLDV: 30 MM HG (ref 40–50)
PH BLDV: 7.48 [PH] (ref 7.32–7.43)
PH UR STRIP: 5.5 [PH] (ref 5–9)
PLATELET # BLD AUTO: 255 10E3/UL (ref 150–450)
PO2 BLDV: 38 MM HG (ref 25–47)
POTASSIUM SERPL-SCNC: 3.4 MMOL/L (ref 3.4–5.3)
PROT SERPL-MCNC: 7.6 G/DL (ref 6.4–8.3)
RBC # BLD AUTO: 5.13 10E6/UL (ref 3.8–5.2)
RBC URINE: 1 /HPF
SODIUM SERPL-SCNC: 137 MMOL/L (ref 136–145)
SP GR UR STRIP: 1.03 (ref 1–1.03)
SQUAMOUS EPITHELIAL: 2 /HPF
TROPONIN T SERPL HS-MCNC: <6 NG/L
UROBILINOGEN UR STRIP-MCNC: NORMAL MG/DL
WBC # BLD AUTO: 16.4 10E3/UL (ref 4–11)
WBC URINE: 4 /HPF

## 2023-01-27 PROCEDURE — 36415 COLL VENOUS BLD VENIPUNCTURE: CPT | Performed by: INTERNAL MEDICINE

## 2023-01-27 PROCEDURE — 93005 ELECTROCARDIOGRAM TRACING: CPT | Performed by: INTERNAL MEDICINE

## 2023-01-27 PROCEDURE — 250N000013 HC RX MED GY IP 250 OP 250 PS 637: Performed by: INTERNAL MEDICINE

## 2023-01-27 PROCEDURE — 99285 EMERGENCY DEPT VISIT HI MDM: CPT | Mod: 25 | Performed by: INTERNAL MEDICINE

## 2023-01-27 PROCEDURE — 83690 ASSAY OF LIPASE: CPT | Performed by: INTERNAL MEDICINE

## 2023-01-27 PROCEDURE — 99284 EMERGENCY DEPT VISIT MOD MDM: CPT | Performed by: INTERNAL MEDICINE

## 2023-01-27 PROCEDURE — 80053 COMPREHEN METABOLIC PANEL: CPT | Performed by: INTERNAL MEDICINE

## 2023-01-27 PROCEDURE — 250N000011 HC RX IP 250 OP 636: Performed by: INTERNAL MEDICINE

## 2023-01-27 PROCEDURE — 96361 HYDRATE IV INFUSION ADD-ON: CPT | Performed by: INTERNAL MEDICINE

## 2023-01-27 PROCEDURE — 81001 URINALYSIS AUTO W/SCOPE: CPT | Performed by: INTERNAL MEDICINE

## 2023-01-27 PROCEDURE — 96375 TX/PRO/DX INJ NEW DRUG ADDON: CPT | Performed by: INTERNAL MEDICINE

## 2023-01-27 PROCEDURE — 82805 BLOOD GASES W/O2 SATURATION: CPT | Performed by: INTERNAL MEDICINE

## 2023-01-27 PROCEDURE — 84484 ASSAY OF TROPONIN QUANT: CPT | Performed by: INTERNAL MEDICINE

## 2023-01-27 PROCEDURE — 83735 ASSAY OF MAGNESIUM: CPT | Performed by: INTERNAL MEDICINE

## 2023-01-27 PROCEDURE — 258N000003 HC RX IP 258 OP 636: Performed by: INTERNAL MEDICINE

## 2023-01-27 PROCEDURE — 96374 THER/PROPH/DIAG INJ IV PUSH: CPT | Performed by: INTERNAL MEDICINE

## 2023-01-27 PROCEDURE — 85025 COMPLETE CBC W/AUTO DIFF WBC: CPT | Performed by: INTERNAL MEDICINE

## 2023-01-27 PROCEDURE — 93010 ELECTROCARDIOGRAM REPORT: CPT | Performed by: STUDENT IN AN ORGANIZED HEALTH CARE EDUCATION/TRAINING PROGRAM

## 2023-01-27 RX ORDER — ONDANSETRON 2 MG/ML
4 INJECTION INTRAMUSCULAR; INTRAVENOUS ONCE
Status: COMPLETED | OUTPATIENT
Start: 2023-01-27 | End: 2023-01-27

## 2023-01-27 RX ORDER — DIPHENHYDRAMINE HYDROCHLORIDE 50 MG/ML
50 INJECTION INTRAMUSCULAR; INTRAVENOUS ONCE
Status: COMPLETED | OUTPATIENT
Start: 2023-01-27 | End: 2023-01-27

## 2023-01-27 RX ORDER — SUCRALFATE 1 G/1
1 TABLET ORAL ONCE
Status: COMPLETED | OUTPATIENT
Start: 2023-01-27 | End: 2023-01-27

## 2023-01-27 RX ORDER — LORAZEPAM 2 MG/ML
1 INJECTION INTRAMUSCULAR ONCE
Status: COMPLETED | OUTPATIENT
Start: 2023-01-27 | End: 2023-01-27

## 2023-01-27 RX ORDER — METOCLOPRAMIDE 10 MG/1
TABLET ORAL
COMMUNITY
Start: 2023-01-26 | End: 2023-01-28

## 2023-01-27 RX ADMIN — DIPHENHYDRAMINE HYDROCHLORIDE 50 MG: 50 INJECTION INTRAMUSCULAR; INTRAVENOUS at 21:12

## 2023-01-27 RX ADMIN — LORAZEPAM 1 MG: 2 INJECTION INTRAMUSCULAR; INTRAVENOUS at 22:11

## 2023-01-27 RX ADMIN — SODIUM CHLORIDE 1000 ML: 9 INJECTION, SOLUTION INTRAVENOUS at 21:12

## 2023-01-27 RX ADMIN — ONDANSETRON HYDROCHLORIDE 4 MG: 2 SOLUTION INTRAMUSCULAR; INTRAVENOUS at 22:12

## 2023-01-27 RX ADMIN — SUCRALFATE 1 G: 1 TABLET ORAL at 21:12

## 2023-01-27 ASSESSMENT — ACTIVITIES OF DAILY LIVING (ADL)
ADLS_ACUITY_SCORE: 35
ADLS_ACUITY_SCORE: 35

## 2023-01-28 LAB
ATRIAL RATE - MUSE: 79 BPM
DIASTOLIC BLOOD PRESSURE - MUSE: NORMAL MMHG
INTERPRETATION ECG - MUSE: NORMAL
P AXIS - MUSE: 40 DEGREES
PR INTERVAL - MUSE: 154 MS
QRS DURATION - MUSE: 100 MS
QT - MUSE: 398 MS
QTC - MUSE: 456 MS
R AXIS - MUSE: 19 DEGREES
SYSTOLIC BLOOD PRESSURE - MUSE: NORMAL MMHG
T AXIS - MUSE: -3 DEGREES
VENTRICULAR RATE- MUSE: 79 BPM

## 2023-01-28 RX ORDER — METOCLOPRAMIDE 10 MG/1
10 TABLET ORAL 3 TIMES DAILY PRN
Qty: 30 TABLET | Refills: 0 | Status: SHIPPED | OUTPATIENT
Start: 2023-01-28 | End: 2023-07-07

## 2023-01-28 RX ORDER — PROCHLORPERAZINE MALEATE 10 MG
10 TABLET ORAL EVERY 6 HOURS PRN
Qty: 10 TABLET | Refills: 0 | Status: SHIPPED | OUTPATIENT
Start: 2023-01-28 | End: 2023-04-19

## 2023-01-28 ASSESSMENT — ENCOUNTER SYMPTOMS
APPETITE CHANGE: 1
NAUSEA: 1
VOMITING: 1

## 2023-01-28 NOTE — ED TRIAGE NOTES
Pt presents to ED after vomiting since Tuesday. Pt reports unable to keep food down. Pt is able to keep water down. Pt has one episode of hyperemesis back in October where she needed IVF and KCl replacement.    Soni Cassidy RN on 1/27/2023 at 8:03 PM       Triage Assessment     Row Name 01/27/23 2002       Skin Circulation/Temperature WDL    Skin Circulation/Temperature WDL WDL       Cardiac WDL    Cardiac WDL WDL       Cognitive/Neuro/Behavioral WDL    Cognitive/Neuro/Behavioral WDL WDL

## 2023-01-28 NOTE — ED PROVIDER NOTES
History     Chief Complaint   Patient presents with     Vomiting     HPI  Antonette Sears is a 32 year old female who presented for cyclical vomiting.  She has been followed for this before.  Has previously had low electrolytes.  She is a daily marijuana user.  Has not used marijuana today.  Continues to have vomiting while here in the emergency room.  She was given Zofran and Reglan and IV fluids with improvement in her symptoms and she is asking to discharge home.    Allergies:  Allergies   Allergen Reactions     Codeine Nausea and Vomiting       Problem List:    Patient Active Problem List    Diagnosis Date Noted     Depression with anxiety 09/12/2022     Priority: Medium     Nausea 12/29/2020     Priority: Medium     Allergic rhinitis 02/19/2018     Priority: Medium     Asthma 02/19/2018     Priority: Medium     Esophageal reflux 02/19/2018     Priority: Medium     Migraine headache 02/19/2018     Priority: Medium     Unspecified disorder of menstruation and other abnormal bleeding from female genital tract 06/01/2012     Priority: Medium     Contraceptive management 07/06/2011     Priority: Medium        Past Medical History:    Past Medical History:   Diagnosis Date     Cervicalgia        Past Surgical History:    Past Surgical History:   Procedure Laterality Date     CHOLECYSTECTOMY      Age 16       Family History:    No family history on file.    Social History:  Marital Status:  Single [1]  Social History     Tobacco Use     Smoking status: Never     Smokeless tobacco: Never   Vaping Use     Vaping Use: Never used   Substance Use Topics     Alcohol use: No     Drug use: Yes     Frequency: 7.0 times per week     Types: Other, Marijuana     Comment: daily use        Medications:    albuterol (PROAIR HFA/PROVENTIL HFA/VENTOLIN HFA) 108 (90 Base) MCG/ACT inhaler  busPIRone (BUSPAR) 10 MG tablet  cetirizine (ZYRTEC) 10 MG tablet  levonorgestrel (MIRENA) 20 MCG/DAY IUD  metoclopramide (REGLAN) 10 MG  "tablet  omeprazole (PRILOSEC) 20 MG CR capsule  omeprazole (PRILOSEC) 20 MG DR capsule  Ondansetron HCl (ZOFRAN PO)  prochlorperazine (COMPAZINE) 10 MG tablet  sertraline (ZOLOFT) 100 MG tablet          Review of Systems   Constitutional: Positive for appetite change.   Gastrointestinal: Positive for nausea and vomiting.       Physical Exam   BP: (!) 169/82  Pulse: 82  Temp: 99.8  F (37.7  C)  Resp: 16  Height: 154.9 cm (5' 1\")  Weight: 131.1 kg (289 lb)  SpO2: 97 %      Physical Exam  Vitals and nursing note reviewed.   Constitutional:       Appearance: Normal appearance.   Neurological:      Mental Status: She is alert.         ED Course          Accepted transfer of care and ongoing treatment of patient from Dr. Woo escamilla at 11 PM.  Patient is feeling improved after interventions given including IV fluids and medication.  Would like to discharge home.  Labs reviewed.    ED Course as of 01/28/23 0013 Fri Jan 27, 2023 2032 Patient evaluated.  Has been vomiting since Tuesday.  Had similar reaction back in October.  Ended up having low potassium and magnesium.  Episode prior to that she ended up having elevated troponin and pericarditis or perimyocarditis done at [facility].  She is feeling weak and rundown.  Having some chest pain.  Some rapid breathing.  Has tried Reglan and Compazine and Zofran for nausea.  Was taking twice daily Prilosec.  Check troponin, EKG, lab work.  Check venous gas.  Magnesium.  Metabolic panel.  Urinalysis.  IV fluids ordered.  IV Benadryl to help with nausea and Carafate to help with indigestion.   2139 CBC shows elevated white blood cell count at 16.4.  Left shift.  Hemoglobin 13.6.  Hematocrit 39.8.  Platelet count 255.   2139 Venous blood gas shows pH 7.48.  CO2 30, O2 38   2206 Lipase is normal.  Magnesium 1.7.  Potassium 3.4.   2206 IV Zofran 4 mg and IV lorazepam 1 mg ordered   2245 Sign out from Dr. Magdaleno.      Procedures              Critical Care time:  none      "          Results for orders placed or performed during the hospital encounter of 01/27/23 (from the past 24 hour(s))   CBC with platelets differential    Narrative    The following orders were created for panel order CBC with platelets differential.  Procedure                               Abnormality         Status                     ---------                               -----------         ------                     CBC with platelets and d...[[id number]]  Abnormal            Final result                 Please view results for these tests on the individual orders.   Comprehensive metabolic panel   Result Value Ref Range    Sodium 137 136 - 145 mmol/L    Potassium 3.4 3.4 - 5.3 mmol/L    Chloride 102 98 - 107 mmol/L    Carbon Dioxide (CO2) 20 (L) 22 - 29 mmol/L    Anion Gap 15 7 - 15 mmol/L    Urea Nitrogen 15.5 6.0 - 20.0 mg/dL    Creatinine 0.83 0.51 - 0.95 mg/dL    Calcium 9.2 8.6 - 10.0 mg/dL    Glucose 124 (H) 70 - 99 mg/dL    Alkaline Phosphatase 54 35 - 104 U/L    AST 25 10 - 35 U/L    ALT 37 (H) 10 - 35 U/L    Protein Total 7.6 6.4 - 8.3 g/dL    Albumin 4.2 3.5 - 5.2 g/dL    Bilirubin Total 0.6 <=1.2 mg/dL    GFR Estimate >90 >60 mL/min/1.73m2   Magnesium   Result Value Ref Range    Magnesium 1.7 1.7 - 2.3 mg/dL   Lipase   Result Value Ref Range    Lipase 32 13 - 60 U/L   Blood gas venous and oxyhgb   Result Value Ref Range    pH Venous 7.48 (H) 7.32 - 7.43    pCO2 Venous 30 (L) 40 - 50 mm Hg    pO2 Venous 38 25 - 47 mm Hg    Bicarbonate Venous 22 21 - 28 mmol/L    FIO2 0     Oxyhemoglobin Venous 78 (H) 70 - 75 %    Base Excess/Deficit (+/-) -0.1 -7.7 - 1.9 mmol/L   Troponin T, High Sensitivity   Result Value Ref Range    Troponin T, High Sensitivity <6 <=14 ng/L   CBC with platelets and differential   Result Value Ref Range    WBC Count 16.4 (H) 4.0 - 11.0 10e3/uL    RBC Count 5.13 3.80 - 5.20 10e6/uL    Hemoglobin 13.6 11.7 - 15.7 g/dL    Hematocrit 39.8 35.0 - 47.0 %    MCV 78 78 - 100 fL    MCH 26.5  26.5 - 33.0 pg    MCHC 34.2 31.5 - 36.5 g/dL    RDW 14.2 10.0 - 15.0 %    Platelet Count 255 150 - 450 10e3/uL    % Neutrophils 85 %    % Lymphocytes 10 %    % Monocytes 4 %    % Eosinophils 0 %    % Basophils 0 %    % Immature Granulocytes 1 %    NRBCs per 100 WBC 0 <1 /100    Absolute Neutrophils 14.0 (H) 1.6 - 8.3 10e3/uL    Absolute Lymphocytes 1.6 0.8 - 5.3 10e3/uL    Absolute Monocytes 0.7 0.0 - 1.3 10e3/uL    Absolute Eosinophils 0.0 0.0 - 0.7 10e3/uL    Absolute Basophils 0.1 0.0 - 0.2 10e3/uL    Absolute Immature Granulocytes 0.1 <=0.4 10e3/uL    Absolute NRBCs 0.0 10e3/uL   Extra Tube    Narrative    The following orders were created for panel order Extra Tube.  Procedure                               Abnormality         Status                     ---------                               -----------         ------                     Extra Red Top Tube[[id number]]                               Final result                 Please view results for these tests on the individual orders.   Extra Red Top Tube   Result Value Ref Range    Hold Specimen JIC    UA with Microscopic reflex to Culture    Specimen: Urine, Midstream   Result Value Ref Range    Color Urine Yellow Colorless, Straw, Light Yellow, Yellow    Appearance Urine Clear Clear    Glucose Urine Negative Negative mg/dL    Bilirubin Urine Negative Negative    Ketones Urine 60 (A) Negative mg/dL    Specific Gravity Urine 1.032 (H) 1.000 - 1.030    Blood Urine Negative Negative    pH Urine 5.5 5.0 - 9.0    Protein Albumin Urine 30 (A) Negative mg/dL    Urobilinogen Urine Normal Normal, 2.0 mg/dL    Nitrite Urine Negative Negative    Leukocyte Esterase Urine Negative Negative    Bacteria Urine Few (A) None Seen /HPF    Mucus Urine Present (A) None Seen /LPF    RBC Urine 1 <=2 /HPF    WBC Urine 4 <=5 /HPF    Squamous Epithelials Urine 2 (H) <=1 /HPF    Narrative    Urine Culture not indicated       Medications   0.9% sodium chloride BOLUS (1,000 mLs  Intravenous New Bag 1/27/23 2112)   diphenhydrAMINE (BENADRYL) injection 50 mg (50 mg Intravenous Given 1/27/23 2112)   sucralfate (CARAFATE) tablet 1 g (1 g Oral Given 1/27/23 2112)   ondansetron (ZOFRAN) injection 4 mg (4 mg Intravenous Given 1/27/23 2212)   LORazepam (ATIVAN) injection 1 mg (1 mg Intravenous Given 1/27/23 2211)       Assessments & Plan (with Medical Decision Making)     I have reviewed the nursing notes.    I have reviewed the findings, diagnosis, plan and need for follow up with the patient.       Medical Decision Making  The patient's presentation is strongly suggestive of an acute and uncomplicated illness or injury.    The patient's evaluation involved:  review of 3+ test result(s) ordered prior to this encounter (see separate area of note for details)    The patient's management involved prescription drug management (including medications given in the ED).        New Prescriptions    PROCHLORPERAZINE (COMPAZINE) 10 MG TABLET    Take 1 tablet (10 mg) by mouth every 6 hours as needed for nausea or vomiting       Final diagnoses:   Cyclical vomiting   Refills given for medication at home.  Discussed cessation of marijuana.  Follow-up with regular doctor.  All questions answered.    1/27/2023   [facility] AND [facility]     Anna Fajardo DO  01/28/23 0014
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
## 2023-03-31 ENCOUNTER — OFFICE VISIT (OUTPATIENT)
Dept: FAMILY MEDICINE | Facility: OTHER | Age: 33
End: 2023-03-31
Attending: NURSE PRACTITIONER
Payer: COMMERCIAL

## 2023-03-31 VITALS
BODY MASS INDEX: 56.65 KG/M2 | DIASTOLIC BLOOD PRESSURE: 72 MMHG | OXYGEN SATURATION: 97 % | SYSTOLIC BLOOD PRESSURE: 112 MMHG | WEIGHT: 293 LBS | HEART RATE: 93 BPM | RESPIRATION RATE: 16 BRPM | TEMPERATURE: 97.7 F

## 2023-03-31 DIAGNOSIS — J02.9 SORE THROAT: ICD-10-CM

## 2023-03-31 DIAGNOSIS — J02.0 STREP PHARYNGITIS: Primary | ICD-10-CM

## 2023-03-31 LAB — GROUP A STREP BY PCR: DETECTED

## 2023-03-31 PROCEDURE — G0463 HOSPITAL OUTPT CLINIC VISIT: HCPCS

## 2023-03-31 PROCEDURE — 99213 OFFICE O/P EST LOW 20 MIN: CPT | Performed by: NURSE PRACTITIONER

## 2023-03-31 PROCEDURE — 87651 STREP A DNA AMP PROBE: CPT | Mod: ZL | Performed by: NURSE PRACTITIONER

## 2023-03-31 RX ORDER — PENICILLIN V POTASSIUM 500 MG/1
500 TABLET, FILM COATED ORAL 2 TIMES DAILY
Qty: 20 TABLET | Refills: 0 | Status: SHIPPED | OUTPATIENT
Start: 2023-03-31 | End: 2023-04-10

## 2023-03-31 RX ORDER — FLUTICASONE PROPIONATE 50 MCG
SPRAY, SUSPENSION (ML) NASAL
COMMUNITY
Start: 2022-09-10

## 2023-03-31 ASSESSMENT — ASTHMA QUESTIONNAIRES
ACT_TOTALSCORE: 23
QUESTION_1 LAST FOUR WEEKS HOW MUCH OF THE TIME DID YOUR ASTHMA KEEP YOU FROM GETTING AS MUCH DONE AT WORK, SCHOOL OR AT HOME: NONE OF THE TIME
QUESTION_3 LAST FOUR WEEKS HOW OFTEN DID YOUR ASTHMA SYMPTOMS (WHEEZING, COUGHING, SHORTNESS OF BREATH, CHEST TIGHTNESS OR PAIN) WAKE YOU UP AT NIGHT OR EARLIER THAN USUAL IN THE MORNING: NOT AT ALL
QUESTION_2 LAST FOUR WEEKS HOW OFTEN HAVE YOU HAD SHORTNESS OF BREATH: NOT AT ALL
QUESTION_4 LAST FOUR WEEKS HOW OFTEN HAVE YOU USED YOUR RESCUE INHALER OR NEBULIZER MEDICATION (SUCH AS ALBUTEROL): ONCE A WEEK OR LESS
ACT_TOTALSCORE: 23
QUESTION_5 LAST FOUR WEEKS HOW WOULD YOU RATE YOUR ASTHMA CONTROL: WELL CONTROLLED

## 2023-03-31 ASSESSMENT — PAIN SCALES - GENERAL: PAINLEVEL: SEVERE PAIN (6)

## 2023-03-31 NOTE — NURSING NOTE
Pt presents to clinic today for possible strep. Symptoms have been present for 2 days and consist of stuffy nose, sore throat.       FOOD SECURITY SCREENING QUESTIONS:    The next two questions are to help us understand your food security.  If you are feeling you need any assistance in this area, we have resources available to support you today.    Hunger Vital Signs:  Within the past 12 months we worried whether our food would run out before we got money to buy more. Never  Within the past 12 months the food we bought just didn't last and we didn't have money to get more. Never      Medication Reconciliation: complete  Alice Looney LPN,LPN on 3/31/2023 at 9:57 AM
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
## 2023-04-17 ENCOUNTER — TRANSFERRED RECORDS (OUTPATIENT)
Dept: MULTI SPECIALTY CLINIC | Facility: CLINIC | Age: 33
End: 2023-04-17

## 2023-04-17 ENCOUNTER — MEDICAL CORRESPONDENCE (OUTPATIENT)
Dept: HEALTH INFORMATION MANAGEMENT | Facility: CLINIC | Age: 33
End: 2023-04-17

## 2023-04-17 LAB
CHOLESTEROL (EXTERNAL): 222 MG/DL (ref 0–200)
HBA1C MFR BLD: 4.9 % (ref 4–5.6)
HDLC SERPL-MCNC: 42 MG/DL
HEP C HIM: NORMAL
HIV 1&2 EXT: NORMAL
HPV ABSTRACT: NORMAL
LDL CHOLESTEROL CALCULATED (EXTERNAL): 137 MG/DL (ref 0–100)
NON HDL CHOLESTEROL (EXTERNAL): 180 MG/DL (ref 0–130)
TRIGLYCERIDES (EXTERNAL): 214 MG/DL

## 2023-04-19 PROBLEM — L73.2 HIDRADENITIS SUPPURATIVA: Status: ACTIVE | Noted: 2020-12-29

## 2023-04-19 PROBLEM — R79.89 ELEVATED TROPONIN: Status: ACTIVE | Noted: 2020-12-29

## 2023-04-19 PROBLEM — R07.9 CHEST PAIN: Status: ACTIVE | Noted: 2020-12-29

## 2023-04-19 PROBLEM — E66.01 MORBID OBESITY (H): Status: ACTIVE | Noted: 2019-01-29

## 2023-04-19 PROBLEM — G47.33 OBSTRUCTIVE SLEEP APNEA SYNDROME: Status: ACTIVE | Noted: 2020-12-29

## 2023-04-19 PROBLEM — J45.20 MILD INTERMITTENT ASTHMA WITHOUT COMPLICATION: Status: ACTIVE | Noted: 2023-04-19

## 2023-04-19 PROBLEM — Z97.5 IUD (INTRAUTERINE DEVICE) IN PLACE: Status: ACTIVE | Noted: 2023-04-17

## 2023-04-19 PROBLEM — G43.109 MIGRAINE WITH AURA AND WITHOUT STATUS MIGRAINOSUS, NOT INTRACTABLE: Status: ACTIVE | Noted: 2023-04-19

## 2023-04-19 PROBLEM — G25.81 RLS (RESTLESS LEGS SYNDROME): Status: ACTIVE | Noted: 2021-08-03

## 2023-04-19 PROBLEM — Z87.891 FORMER SMOKER: Status: ACTIVE | Noted: 2023-04-19

## 2023-04-19 PROBLEM — O28.0 ABNORMAL QUAD SCREEN: Status: ACTIVE | Noted: 2018-04-20

## 2023-04-19 PROBLEM — I21.4 NSTEMI (NON-ST ELEVATED MYOCARDIAL INFARCTION) (H): Status: ACTIVE | Noted: 2020-12-29

## 2023-04-19 RX ORDER — OMEPRAZOLE 40 MG/1
40 CAPSULE, DELAYED RELEASE ORAL 2 TIMES DAILY
COMMUNITY

## 2023-06-15 DIAGNOSIS — H91.93 DECREASED HEARING OF BOTH EARS: Primary | ICD-10-CM

## 2023-07-07 ENCOUNTER — OFFICE VISIT (OUTPATIENT)
Dept: FAMILY MEDICINE | Facility: OTHER | Age: 33
End: 2023-07-07
Payer: COMMERCIAL

## 2023-07-07 VITALS
BODY MASS INDEX: 52.24 KG/M2 | OXYGEN SATURATION: 99 % | RESPIRATION RATE: 16 BRPM | HEART RATE: 59 BPM | DIASTOLIC BLOOD PRESSURE: 90 MMHG | SYSTOLIC BLOOD PRESSURE: 132 MMHG | WEIGHT: 276.5 LBS | TEMPERATURE: 98 F

## 2023-07-07 DIAGNOSIS — R11.15 CYCLICAL VOMITING SYNDROME NOT ASSOCIATED WITH MIGRAINE: Primary | ICD-10-CM

## 2023-07-07 LAB
ALBUMIN SERPL BCG-MCNC: 4.5 G/DL (ref 3.5–5.2)
ALP SERPL-CCNC: 50 U/L (ref 35–104)
ALT SERPL W P-5'-P-CCNC: 35 U/L (ref 0–50)
ANION GAP SERPL CALCULATED.3IONS-SCNC: 14 MMOL/L (ref 7–15)
AST SERPL W P-5'-P-CCNC: 22 U/L (ref 0–45)
BASOPHILS # BLD AUTO: 0.1 10E3/UL (ref 0–0.2)
BASOPHILS NFR BLD AUTO: 1 %
BILIRUB SERPL-MCNC: 0.7 MG/DL
BUN SERPL-MCNC: 8.4 MG/DL (ref 6–20)
CALCIUM SERPL-MCNC: 9.3 MG/DL (ref 8.6–10)
CHLORIDE SERPL-SCNC: 100 MMOL/L (ref 98–107)
CREAT SERPL-MCNC: 0.88 MG/DL (ref 0.51–0.95)
DEPRECATED HCO3 PLAS-SCNC: 24 MMOL/L (ref 22–29)
EOSINOPHIL # BLD AUTO: 0.2 10E3/UL (ref 0–0.7)
EOSINOPHIL NFR BLD AUTO: 2 %
ERYTHROCYTE [DISTWIDTH] IN BLOOD BY AUTOMATED COUNT: 13.9 % (ref 10–15)
GFR SERPL CREATININE-BSD FRML MDRD: 88 ML/MIN/1.73M2
GLUCOSE SERPL-MCNC: 96 MG/DL (ref 70–99)
HCT VFR BLD AUTO: 39.9 % (ref 35–47)
HGB BLD-MCNC: 14.2 G/DL (ref 11.7–15.7)
HOLD SPECIMEN: NORMAL
IMM GRANULOCYTES # BLD: 0 10E3/UL
IMM GRANULOCYTES NFR BLD: 0 %
LYMPHOCYTES # BLD AUTO: 2.4 10E3/UL (ref 0.8–5.3)
LYMPHOCYTES NFR BLD AUTO: 23 %
MAGNESIUM SERPL-MCNC: 2 MG/DL (ref 1.7–2.3)
MCH RBC QN AUTO: 27.1 PG (ref 26.5–33)
MCHC RBC AUTO-ENTMCNC: 35.6 G/DL (ref 31.5–36.5)
MCV RBC AUTO: 76 FL (ref 78–100)
MONOCYTES # BLD AUTO: 1.1 10E3/UL (ref 0–1.3)
MONOCYTES NFR BLD AUTO: 11 %
NEUTROPHILS # BLD AUTO: 6.6 10E3/UL (ref 1.6–8.3)
NEUTROPHILS NFR BLD AUTO: 63 %
NRBC # BLD AUTO: 0 10E3/UL
NRBC BLD AUTO-RTO: 0 /100
PLATELET # BLD AUTO: 271 10E3/UL (ref 150–450)
POTASSIUM SERPL-SCNC: 3.1 MMOL/L (ref 3.4–5.3)
PROT SERPL-MCNC: 7.7 G/DL (ref 6.4–8.3)
RBC # BLD AUTO: 5.24 10E6/UL (ref 3.8–5.2)
SODIUM SERPL-SCNC: 138 MMOL/L (ref 136–145)
WBC # BLD AUTO: 10.4 10E3/UL (ref 4–11)

## 2023-07-07 PROCEDURE — 80053 COMPREHEN METABOLIC PANEL: CPT | Mod: ZL

## 2023-07-07 PROCEDURE — 85004 AUTOMATED DIFF WBC COUNT: CPT | Mod: ZL

## 2023-07-07 PROCEDURE — G0463 HOSPITAL OUTPT CLINIC VISIT: HCPCS

## 2023-07-07 PROCEDURE — 99214 OFFICE O/P EST MOD 30 MIN: CPT

## 2023-07-07 PROCEDURE — 36415 COLL VENOUS BLD VENIPUNCTURE: CPT | Mod: ZL

## 2023-07-07 PROCEDURE — 83735 ASSAY OF MAGNESIUM: CPT | Mod: ZL

## 2023-07-07 RX ORDER — METOCLOPRAMIDE 10 MG/1
10 TABLET ORAL 3 TIMES DAILY PRN
Qty: 30 TABLET | Refills: 0 | Status: SHIPPED | OUTPATIENT
Start: 2023-07-07

## 2023-07-07 ASSESSMENT — PAIN SCALES - GENERAL: PAINLEVEL: NO PAIN (0)
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
## 2023-07-07 NOTE — NURSING NOTE
"Chief Complaint   Patient presents with     Vomiting       FOOD SECURITY SCREENING QUESTIONS  Hunger Vital Signs:  Within the past 12 months we worried whether our food would run out before we got money to buy more. Never  Within the past 12 months the food we bought just didn't last and we didn't have money to get more. Never  Joan Lott LPN 7/7/2023 12:05 PM      Initial BP (!) 132/90 (BP Location: Left arm, Patient Position: Sitting, Cuff Size: Adult Large)   Pulse 59   Temp 98  F (36.7  C) (Tympanic)   Resp 16   Wt 125.4 kg (276 lb 8 oz)   SpO2 99%   Breastfeeding No   BMI 52.24 kg/m   Estimated body mass index is 52.24 kg/m  as calculated from the following:    Height as of 1/27/23: 1.549 m (5' 1\").    Weight as of this encounter: 125.4 kg (276 lb 8 oz).  Medication Reconciliation: complete    Joan Lott LPN  "

## 2023-07-07 NOTE — PATIENT INSTRUCTIONS
Your labs overall looked good.  White count was normal.  Your sodium was normal.  Magnesium was normal.  Potassium was mildly decreased at 3.1.  As you are now tolerating oral intake I recommend intake of foods high in potassium such as bananas.  Follow-up if you develop any new symptoms or have any concerns.

## 2023-08-03 ENCOUNTER — OFFICE VISIT (OUTPATIENT)
Dept: FAMILY MEDICINE | Facility: OTHER | Age: 33
End: 2023-08-03
Payer: COMMERCIAL

## 2023-08-03 VITALS
RESPIRATION RATE: 16 BRPM | BODY MASS INDEX: 55.62 KG/M2 | WEIGHT: 283.3 LBS | HEART RATE: 77 BPM | HEIGHT: 60 IN | DIASTOLIC BLOOD PRESSURE: 88 MMHG | SYSTOLIC BLOOD PRESSURE: 124 MMHG | TEMPERATURE: 98.7 F | OXYGEN SATURATION: 98 %

## 2023-08-03 DIAGNOSIS — H66.001 NON-RECURRENT ACUTE SUPPURATIVE OTITIS MEDIA OF RIGHT EAR WITHOUT SPONTANEOUS RUPTURE OF TYMPANIC MEMBRANE: ICD-10-CM

## 2023-08-03 DIAGNOSIS — J06.9 UPPER RESPIRATORY TRACT INFECTION, UNSPECIFIED TYPE: Primary | ICD-10-CM

## 2023-08-03 LAB — GROUP A STREP BY PCR: NOT DETECTED

## 2023-08-03 PROCEDURE — 99213 OFFICE O/P EST LOW 20 MIN: CPT | Performed by: STUDENT IN AN ORGANIZED HEALTH CARE EDUCATION/TRAINING PROGRAM

## 2023-08-03 PROCEDURE — G0463 HOSPITAL OUTPT CLINIC VISIT: HCPCS

## 2023-08-03 PROCEDURE — 87651 STREP A DNA AMP PROBE: CPT | Mod: ZL | Performed by: STUDENT IN AN ORGANIZED HEALTH CARE EDUCATION/TRAINING PROGRAM

## 2023-08-03 ASSESSMENT — PAIN SCALES - GENERAL: PAINLEVEL: SEVERE PAIN (7)

## 2023-08-03 NOTE — NURSING NOTE
Chief Complaint   Patient presents with    Throat Problem     Patient presents to the clinic for sore throat, congestion and ear pain since Monday.     Kasandra Marquez LPN       FOOD SECURITY SCREENING QUESTIONS:    The next two questions are to help us understand your food security.  If you are feeling you need any assistance in this area, we have resources available to support you today.    Hunger Vital Signs:  Within the past 12 months we worried whether our food would run out before we got money to buy more. Never  Within the past 12 months the food we bought just didn't last and we didn't have money to get more. Never    Food Insecurity: Not on file

## 2023-08-03 NOTE — PATIENT INSTRUCTIONS
Sore throat and ear pain    Strep test is pending.    Continue over-the-counter management for ear symptoms over the next 1 to 2 days.  If pain does continue to worsen, Augmentin twice a day for 7 days.    Follow-up with primary care if symptoms persist.    Return to [facility] or go to the ER if symptoms worsen or change.

## 2023-08-03 NOTE — PROGRESS NOTES
"  Assessment & Plan     (J06.9) Upper respiratory tract infection, unspecified type  (primary encounter diagnosis)  Comment: URI.  Strep testing was negative.  Vital signs are stable.  Otherwise appears well.  COVID test at home was also negative.  Plan: Continue symptom management.  Follow-up with PCP if symptoms persist.  Return to [facility] or ER if symptoms worsen or change.    (H66.001) Non-recurrent acute suppurative otitis media of right ear without spontaneous rupture of tympanic membrane    Comment: Possible start of otitis media of the right ear.  This appears mild in nature.  Discussed watchful waiting.  If symptoms worsen, start antibiotic therapy.    Plan: Group A Streptococcus PCR Throat Swab,         amoxicillin-clavulanate (AUGMENTIN) 875-125 MG         tablet        Watchful waiting, if symptoms do not improve in the next 2 to 3 days, consider Augmentin twice a day for 7 days to treat ear infection.  If symptoms improve, no need for antibiotic therapy.  Continue ibuprofen and or Tylenol in the meantime.  Lots of fluids, rest.      Mary Kate Hagen PA-C  [facility] [place]   Antonette is a 33 year old, presenting for the following health issues:  Throat Problem    HPI     Patient presents today with concerns of sore throat, congestion, and now ear pain. States symptoms started two days ago with ear pain starting in right ear today. She has had chills/aches. She has used dayquil/nyquil. She continues to drink plenty of fluids. Negative at home COVID test.     Review of Systems   Constitutional, HEENT, cardiovascular, pulmonary, gi and gu systems are negative, except as otherwise noted.      Objective    /88 (BP Location: Left arm, Patient Position: Sitting, Cuff Size: Adult Large)   Pulse 77   Temp 98.7  F (37.1  C) (Tympanic)   Resp 16   Ht 1.535 m (5' 0.43\")   Wt 128.5 kg (283 lb 4.8 oz)   SpO2 98%   BMI 54.54 kg/m    Body mass index is 54.54 " kg/m .    Physical Exam   GENERAL: healthy, alert and no distress  EYES: Eyes grossly normal to inspection, PERRL and conjunctivae and sclerae normal  HENT: Left ear canal and TM normal, right ear canal normal, TM with slight dulling, slight bulging, minimal erythema, nose and mouth without ulcers or lesions  NECK: no adenopathy, no asymmetry, masses, or scars and thyroid normal to palpation  RESP: lungs clear to auscultation - no rales, rhonchi or wheezes  CV: regular rate and rhythm, normal S1 S2, no S3 or S4, no murmur, click or rub, no peripheral edema and peripheral pulses strong  MS: no gross musculoskeletal defects noted, no edema      Results for orders placed or performed in visit on 08/03/23   Group A Streptococcus PCR Throat Swab     Status: Normal    Specimen: Throat; Swab   Result Value Ref Range    Group A strep by PCR Not Detected Not Detected    Narrative    The Xpert Xpress Strep A test, performed on the [employer], is a rapid, qualitative in vitro diagnostic test for the detection of Streptococcus pyogenes (Group A ß-hemolytic Streptococcus, Strep A) in throat swab specimens from patients with signs and symptoms of pharyngitis. The Xpert Xpress Strep A test can be used as an aid in the diagnosis of Group A Streptococcal pharyngitis. The assay is not intended to monitor treatment for Group A Streptococcus infections. The Xpert Xpress Strep A test utilizes an automated real-time polymerase chain reaction (PCR) to detect Streptococcus pyogenes DNA.

## 2023-09-29 DIAGNOSIS — F41.8 DEPRESSION WITH ANXIETY: ICD-10-CM

## 2023-10-04 RX ORDER — SERTRALINE HYDROCHLORIDE 100 MG/1
100 TABLET, FILM COATED ORAL DAILY
Qty: 90 TABLET | Refills: 4 | OUTPATIENT
Start: 2023-10-04

## 2023-10-04 NOTE — TELEPHONE ENCOUNTER
[facility] of [place], [place] sent Rx request for the following:      Requested Prescriptions   Pending Prescriptions Disp Refills    sertraline (ZOLOFT) 100 MG tablet [Pharmacy Med Name: SERTRALINE 100MG TABLETS] 90 tablet 4     Sig: TAKE 1 TABLET(100 MG) BY MOUTH DAILY       SSRIs Protocol Failed - 9/29/2023  5:28 AM        Failed - PHQ-9 score less than 5 in past 6 months     Please review last PHQ-9 score.        Pt is seen at [facility]. Last filled there on 4/17. Denying this request.     MANI PALACIOS RN on 10/4/2023 at 4:07 PM

## 2024-01-11 ENCOUNTER — HOSPITAL ENCOUNTER (EMERGENCY)
Facility: OTHER | Age: 34
Discharge: HOME | End: 2024-01-11
Attending: PHYSICIAN ASSISTANT | Admitting: PHYSICIAN ASSISTANT
Payer: COMMERCIAL

## 2024-01-11 VITALS
HEART RATE: 66 BPM | TEMPERATURE: 99.5 F | DIASTOLIC BLOOD PRESSURE: 98 MMHG | BODY MASS INDEX: 49.09 KG/M2 | RESPIRATION RATE: 20 BRPM | HEIGHT: 61 IN | WEIGHT: 260 LBS | SYSTOLIC BLOOD PRESSURE: 138 MMHG | OXYGEN SATURATION: 98 %

## 2024-01-11 DIAGNOSIS — R11.2 NAUSEA AND VOMITING: ICD-10-CM

## 2024-01-11 DIAGNOSIS — E87.6 HYPOKALEMIA: ICD-10-CM

## 2024-01-11 LAB
ALBUMIN SERPL BCG-MCNC: 4.7 G/DL (ref 3.5–5.2)
ALBUMIN UR-MCNC: 50 MG/DL
ALP SERPL-CCNC: 72 U/L (ref 40–150)
ALT SERPL W P-5'-P-CCNC: 46 U/L (ref 0–50)
AMPHETAMINES UR QL SCN: ABNORMAL
ANION GAP SERPL CALCULATED.3IONS-SCNC: 14 MMOL/L (ref 7–15)
APPEARANCE UR: ABNORMAL
AST SERPL W P-5'-P-CCNC: 27 U/L (ref 0–45)
BACTERIA #/AREA URNS HPF: ABNORMAL /HPF
BARBITURATES UR QL SCN: ABNORMAL
BASOPHILS # BLD AUTO: 0.1 10E3/UL (ref 0–0.2)
BASOPHILS NFR BLD AUTO: 0 %
BENZODIAZ UR QL SCN: ABNORMAL
BILIRUB SERPL-MCNC: 1 MG/DL
BILIRUB UR QL STRIP: NEGATIVE
BUN SERPL-MCNC: 14.7 MG/DL (ref 6–20)
BZE UR QL SCN: ABNORMAL
CALCIUM SERPL-MCNC: 9.6 MG/DL (ref 8.6–10)
CANNABINOIDS UR QL SCN: ABNORMAL
CHLORIDE SERPL-SCNC: 95 MMOL/L (ref 98–107)
COLOR UR AUTO: YELLOW
CREAT SERPL-MCNC: 0.84 MG/DL (ref 0.51–0.95)
DEPRECATED HCO3 PLAS-SCNC: 23 MMOL/L (ref 22–29)
EGFRCR SERPLBLD CKD-EPI 2021: >90 ML/MIN/1.73M2
EOSINOPHIL # BLD AUTO: 0 10E3/UL (ref 0–0.7)
EOSINOPHIL NFR BLD AUTO: 0 %
ERYTHROCYTE [DISTWIDTH] IN BLOOD BY AUTOMATED COUNT: 13.9 % (ref 10–15)
FENTANYL UR QL: ABNORMAL
FLUAV RNA SPEC QL NAA+PROBE: NEGATIVE
FLUBV RNA RESP QL NAA+PROBE: NEGATIVE
GLUCOSE SERPL-MCNC: 98 MG/DL (ref 70–99)
GLUCOSE UR STRIP-MCNC: NEGATIVE MG/DL
HCG UR QL: NEGATIVE
HCT VFR BLD AUTO: 43 % (ref 35–47)
HGB BLD-MCNC: 15.5 G/DL (ref 11.7–15.7)
HGB UR QL STRIP: NEGATIVE
HOLD SPECIMEN: NORMAL
IMM GRANULOCYTES # BLD: 0.1 10E3/UL
IMM GRANULOCYTES NFR BLD: 1 %
KETONES UR STRIP-MCNC: 40 MG/DL
LEUKOCYTE ESTERASE UR QL STRIP: NEGATIVE
LYMPHOCYTES # BLD AUTO: 3.4 10E3/UL (ref 0.8–5.3)
LYMPHOCYTES NFR BLD AUTO: 18 %
MAGNESIUM SERPL-MCNC: 2.1 MG/DL (ref 1.7–2.3)
MCH RBC QN AUTO: 27.4 PG (ref 26.5–33)
MCHC RBC AUTO-ENTMCNC: 36 G/DL (ref 31.5–36.5)
MCV RBC AUTO: 76 FL (ref 78–100)
MONOCYTES # BLD AUTO: 1.8 10E3/UL (ref 0–1.3)
MONOCYTES NFR BLD AUTO: 10 %
MUCOUS THREADS #/AREA URNS LPF: PRESENT /LPF
NEUTROPHILS # BLD AUTO: 13.2 10E3/UL (ref 1.6–8.3)
NEUTROPHILS NFR BLD AUTO: 71 %
NITRATE UR QL: NEGATIVE
NRBC # BLD AUTO: 0 10E3/UL
NRBC BLD AUTO-RTO: 0 /100
OPIATES UR QL SCN: ABNORMAL
PCP QUAL URINE (ROCHE): ABNORMAL
PH UR STRIP: 6.5 [PH] (ref 5–9)
PLATELET # BLD AUTO: 295 10E3/UL (ref 150–450)
POTASSIUM SERPL-SCNC: 3.2 MMOL/L (ref 3.4–5.3)
PROT SERPL-MCNC: 8.5 G/DL (ref 6.4–8.3)
RBC # BLD AUTO: 5.65 10E6/UL (ref 3.8–5.2)
RBC URINE: 1 /HPF
RSV RNA SPEC NAA+PROBE: NEGATIVE
SARS-COV-2 RNA RESP QL NAA+PROBE: NEGATIVE
SODIUM SERPL-SCNC: 132 MMOL/L (ref 135–145)
SP GR UR STRIP: 1.03 (ref 1–1.03)
SQUAMOUS EPITHELIAL: 7 /HPF
UROBILINOGEN UR STRIP-MCNC: NORMAL MG/DL
WBC # BLD AUTO: 18.6 10E3/UL (ref 4–11)
WBC URINE: 3 /HPF

## 2024-01-11 PROCEDURE — 80307 DRUG TEST PRSMV CHEM ANLYZR: CPT | Performed by: PHYSICIAN ASSISTANT

## 2024-01-11 PROCEDURE — 81025 URINE PREGNANCY TEST: CPT | Performed by: PHYSICIAN ASSISTANT

## 2024-01-11 PROCEDURE — 85025 COMPLETE CBC W/AUTO DIFF WBC: CPT | Performed by: PHYSICIAN ASSISTANT

## 2024-01-11 PROCEDURE — 83735 ASSAY OF MAGNESIUM: CPT | Performed by: PHYSICIAN ASSISTANT

## 2024-01-11 PROCEDURE — 81001 URINALYSIS AUTO W/SCOPE: CPT | Mod: XU | Performed by: PHYSICIAN ASSISTANT

## 2024-01-11 PROCEDURE — 99283 EMERGENCY DEPT VISIT LOW MDM: CPT | Performed by: PHYSICIAN ASSISTANT

## 2024-01-11 PROCEDURE — 80053 COMPREHEN METABOLIC PANEL: CPT | Performed by: PHYSICIAN ASSISTANT

## 2024-01-11 PROCEDURE — 96374 THER/PROPH/DIAG INJ IV PUSH: CPT | Performed by: PHYSICIAN ASSISTANT

## 2024-01-11 PROCEDURE — 87637 SARSCOV2&INF A&B&RSV AMP PRB: CPT | Performed by: PHYSICIAN ASSISTANT

## 2024-01-11 PROCEDURE — 99284 EMERGENCY DEPT VISIT MOD MDM: CPT | Mod: 25 | Performed by: PHYSICIAN ASSISTANT

## 2024-01-11 PROCEDURE — 250N000013 HC RX MED GY IP 250 OP 250 PS 637: Performed by: PHYSICIAN ASSISTANT

## 2024-01-11 PROCEDURE — 250N000011 HC RX IP 250 OP 636: Performed by: PHYSICIAN ASSISTANT

## 2024-01-11 PROCEDURE — 258N000003 HC RX IP 258 OP 636: Performed by: PHYSICIAN ASSISTANT

## 2024-01-11 PROCEDURE — 96361 HYDRATE IV INFUSION ADD-ON: CPT | Performed by: PHYSICIAN ASSISTANT

## 2024-01-11 RX ORDER — CAPSAICIN 0.025 %
1 CREAM (GRAM) TOPICAL 3 TIMES DAILY
Qty: 118 ML | Refills: 0 | Status: SHIPPED | OUTPATIENT
Start: 2024-01-11

## 2024-01-11 RX ORDER — POTASSIUM CHLORIDE 1500 MG/1
20 TABLET, EXTENDED RELEASE ORAL ONCE
Status: COMPLETED | OUTPATIENT
Start: 2024-01-11 | End: 2024-01-11

## 2024-01-11 RX ORDER — ONDANSETRON 2 MG/ML
4 INJECTION INTRAMUSCULAR; INTRAVENOUS ONCE
Status: COMPLETED | OUTPATIENT
Start: 2024-01-11 | End: 2024-01-11

## 2024-01-11 RX ADMIN — POTASSIUM CHLORIDE 20 MEQ: 1500 TABLET, EXTENDED RELEASE ORAL at 18:34

## 2024-01-11 RX ADMIN — SODIUM CHLORIDE 1000 ML: 9 INJECTION, SOLUTION INTRAVENOUS at 17:22

## 2024-01-11 RX ADMIN — ONDANSETRON 4 MG: 2 INJECTION INTRAMUSCULAR; INTRAVENOUS at 17:33

## 2024-01-11 ASSESSMENT — ACTIVITIES OF DAILY LIVING (ADL): ADLS_ACUITY_SCORE: 35

## 2024-01-11 NOTE — ED TRIAGE NOTES
Pt has cyclical vomiting has been vomiting for 6 days.Also associated dizziness and weakness with hot/cold flashes.  Had cannabis a day or two before sx started.      Triage Assessment (Adult)       Row Name 01/11/24 1533          Triage Assessment    Airway WDL WDL        Respiratory WDL    Respiratory WDL WDL        Skin Circulation/Temperature WDL    Skin Circulation/Temperature WDL WDL        Cardiac WDL    Cardiac WDL WDL        Peripheral/Neurovascular WDL    Peripheral Neurovascular WDL WDL        Cognitive/Neuro/Behavioral WDL    Cognitive/Neuro/Behavioral WDL WDL

## 2024-01-12 ASSESSMENT — ENCOUNTER SYMPTOMS
SHORTNESS OF BREATH: 0
FEVER: 0
CHILLS: 0
COUGH: 0
VOMITING: 1
HEMATURIA: 0
NAUSEA: 1
ABDOMINAL PAIN: 0

## 2024-01-12 NOTE — ED PROVIDER NOTES
History     Chief Complaint   Patient presents with    Nausea, Vomiting, & Diarrhea     For about a week     HPI  Antonette Sears is a 33 year old female who presents to the ED for evaluation of nausea vomiting and diarrhea.  Pt has cyclical vomiting has been vomiting for 6 days.Also associated dizziness and weakness with hot/cold flashes.  Had cannabis a day or two before sx started.        Allergies:  Allergies   Allergen Reactions    Codeine Nausea and Vomiting    Morphine        Problem List:    Patient Active Problem List    Diagnosis Date Noted    Former smoker 04/19/2023     Priority: Medium     Formatting of this note might be different from the original.  IMO Update 10/11      Mild intermittent asthma without complication 04/19/2023     Priority: Medium    Migraine with aura and without status migrainosus, not intractable 04/19/2023     Priority: Medium     Formatting of this note might be different from the original.  IMO Update 10/11      IUD (intrauterine device) in place 04/17/2023     Priority: Medium    Depression with anxiety 09/12/2022     Priority: Medium    RLS (restless legs syndrome) 08/03/2021     Priority: Medium    Nausea 12/29/2020     Priority: Medium    Chest pain 12/29/2020     Priority: Medium    Elevated troponin 12/29/2020     Priority: Medium    Hidradenitis suppurativa 12/29/2020     Priority: Medium    NSTEMI (non-ST elevated myocardial infarction) (H) 12/29/2020     Priority: Medium    Obstructive sleep apnea syndrome 12/29/2020     Priority: Medium    Morbid obesity (H) 01/29/2019     Priority: Medium    Postpartum depression 07/30/2018     Priority: Medium    Abnormal quad screen 04/20/2018     Priority: Medium    Allergic rhinitis 02/19/2018     Priority: Medium    Asthma 02/19/2018     Priority: Medium    Esophageal reflux 02/19/2018     Priority: Medium    Intractable migraine without aura 10/23/2013     Priority: Medium    RTA (renal tubular acidosis) 11/05/2012      Priority: Medium    Unspecified disorder of menstruation and other abnormal bleeding from female genital tract 2012     Priority: Medium    Contraceptive management 2011     Priority: Medium    CTS (carpal tunnel syndrome) 2011     Priority: Medium     Formatting of this note might be different from the original.  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  IMO Update 10/11      Menstrual migraine 2010     Priority: Medium        Past Medical History:    Past Medical History:   Diagnosis Date    Cervicalgia        Past Surgical History:    Past Surgical History:   Procedure Laterality Date    AXILLARY SURGERY      CARPAL TUNNEL RELEASE RT/LT Bilateral      SECTION  2018    CHOLECYSTECTOMY      Age 16    CHOLECYSTECTOMY      COLONOSCOPY  2012    EGD  2008    UPPER GASTROINTESTINAL ENDOSCOPY  2022       Family History:    Family History   Problem Relation Age of Onset    Hypertension Mother     Cholelithiasis Mother     Carpal tunnel syndrome Mother     Asthma Mother     Hyperlipidemia Mother     Chirinos's esophagus Mother     Diabetes Type 2  Mother     Asthma Father     Cardiomyopathy Father     Heart Failure Father     GERD Father     Alcoholism Father     Cholelithiasis Maternal Grandmother     Diabetes Maternal Grandfather     Diabetes Paternal Grandmother     Substance Abuse Brother     Substance Abuse Sister     Attention Deficit Disorder Son     Asthma Son     Retinopathy of prematurity Son        Social History:  Marital Status:  Single [1]  Social History     Tobacco Use    Smoking status: Former     Packs/day: .5     Types: Cigarettes     Quit date: 2012     Years since quittin.7    Smokeless tobacco: Never   Vaping Use    Vaping Use: Never used   Substance Use Topics    Alcohol use: No     "Drug use: Yes     Frequency: 7.0 times per week     Types: Other, Marijuana     Comment: daily use        Medications:    capsaicin (ZOSTRIX) 0.025 % external cream  albuterol (PROAIR HFA/PROVENTIL HFA/VENTOLIN HFA) 108 (90 Base) MCG/ACT inhaler  busPIRone (BUSPAR) 10 MG tablet  cetirizine (ZYRTEC) 10 MG tablet  fluticasone (FLONASE) 50 MCG/ACT nasal spray  metoclopramide (REGLAN) 10 MG tablet  omeprazole (PRILOSEC) 40 MG DR capsule  Ondansetron HCl (ZOFRAN PO)  sertraline (ZOLOFT) 100 MG tablet          Review of Systems   Constitutional:  Negative for chills and fever.   HENT:  Negative for congestion.    Eyes:  Negative for visual disturbance.   Respiratory:  Negative for cough and shortness of breath.    Cardiovascular:  Negative for chest pain.   Gastrointestinal:  Positive for nausea and vomiting. Negative for abdominal pain.   Genitourinary:  Negative for hematuria.   Allergic/Immunologic: Negative for immunocompromised state.   Neurological:  Negative for syncope.       Physical Exam   BP: (!) 144/93  Pulse: 91  Temp: (!) 100.6  F (38.1  C)  Resp: 20  Height: 154.9 cm (5' 1\")  Weight: 117.9 kg (260 lb)  SpO2: 99 %      Physical Exam  Constitutional:       General: She is not in acute distress.     Appearance: She is well-developed. She is not diaphoretic.   HENT:      Head: Normocephalic and atraumatic.   Eyes:      General: No scleral icterus.     Conjunctiva/sclera: Conjunctivae normal.   Cardiovascular:      Rate and Rhythm: Normal rate and regular rhythm.   Pulmonary:      Effort: Pulmonary effort is normal.      Breath sounds: Normal breath sounds.   Abdominal:      Palpations: Abdomen is soft.      Tenderness: There is no abdominal tenderness.   Musculoskeletal:         General: No deformity.      Cervical back: Neck supple.   Lymphadenopathy:      Cervical: No cervical adenopathy.   Skin:     General: Skin is warm and dry.      Coloration: Skin is not jaundiced.      Findings: No rash.   Neurological: "      General: No focal deficit present.      Mental Status: She is alert and oriented to person, place, and time. Mental status is at baseline.   Psychiatric:         Mood and Affect: Mood normal.         Behavior: Behavior normal.         Thought Content: Thought content normal.         Judgment: Judgment normal.         ED Course                 Procedures              Critical Care time:  none               Results for orders placed or performed during the hospital encounter of 01/11/24 (from the past 24 hour(s))   UA with Microscopic reflex to Culture    Specimen: Urine, Clean Catch   Result Value Ref Range    Color Urine Yellow Colorless, Straw, Light Yellow, Yellow    Appearance Urine Slightly Cloudy (A) Clear    Glucose Urine Negative Negative mg/dL    Bilirubin Urine Negative Negative    Ketones Urine 40 (A) Negative mg/dL    Specific Gravity Urine 1.031 (H) 1.000 - 1.030    Blood Urine Negative Negative    pH Urine 6.5 5.0 - 9.0    Protein Albumin Urine 50 (A) Negative mg/dL    Urobilinogen Urine Normal Normal, 2.0 mg/dL    Nitrite Urine Negative Negative    Leukocyte Esterase Urine Negative Negative    Bacteria Urine Few (A) None Seen /HPF    Mucus Urine Present (A) None Seen /LPF    RBC Urine 1 <=2 /HPF    WBC Urine 3 <=5 /HPF    Squamous Epithelials Urine 7 (H) <=1 /HPF    Narrative    Urine Culture not indicated   HCG qualitative urine (UPT)   Result Value Ref Range    hCG Urine Qualitative Negative Negative   Urine Drug Screen    Narrative    The following orders were created for panel order Urine Drug Screen.  Procedure                               Abnormality         Status                     ---------                               -----------         ------                     Urine Drug Screen Panel[[id number]]      Abnormal            Final result                 Please view results for these tests on the individual orders.   Urine Drug Screen Panel   Result Value Ref Range    Amphetamines Urine  Screen Negative Screen Negative    Barbituates Urine Screen Negative Screen Negative    Benzodiazepine Urine Screen Negative Screen Negative    Cannabinoids Urine Screen Positive (A) Screen Negative    Cocaine Urine Screen Negative Screen Negative    Fentanyl Qual Urine Screen Negative Screen Negative    Opiates Urine Screen Negative Screen Negative    PCP Urine Screen Negative Screen Negative   Symptomatic Influenza A/B, RSV, & SARS-CoV2 PCR (COVID-19) Nose    Specimen: Nose; Swab   Result Value Ref Range    Influenza A PCR Negative Negative    Influenza B PCR Negative Negative    RSV PCR Negative Negative    SARS CoV2 PCR Negative Negative    Narrative    Testing was performed using the Xpert Xpress CoV2/Flu/RSV Assay on the [employer] GeneXpert Instrument. This test should be ordered for the detection of SARS-CoV-2, influenza, and RSV viruses in individuals who meet clinical and/or epidemiological criteria. Test performance is unknown in asymptomatic patients. This test is for in vitro diagnostic use under the FDA EUA for laboratories certified under CLIA to perform high or moderate complexity testing. This test has not been FDA cleared or approved. A negative result does not rule out the presence of PCR inhibitors in the specimen or target RNA in concentration below the limit of detection for the assay. If only one viral target is positive but coinfection with multiple targets is suspected, the sample should be re-tested with another FDA cleared, approved, or authorized test, if coinfection would change clinical management. This test was validated by the [facility]. These laboratories are certified under the Clinical Laboratory Improvement Amendments of 1988 (CLIA-88) as qualified to perform high complexity laboratory testing.   CBC with platelets differential    Narrative    The following orders were created for panel order CBC with platelets differential.  Procedure                                Abnormality         Status                     ---------                               -----------         ------                     CBC with platelets and d...[[id number]]  Abnormal            Final result                 Please view results for these tests on the individual orders.   Comprehensive metabolic panel   Result Value Ref Range    Sodium 132 (L) 135 - 145 mmol/L    Potassium 3.2 (L) 3.4 - 5.3 mmol/L    Carbon Dioxide (CO2) 23 22 - 29 mmol/L    Anion Gap 14 7 - 15 mmol/L    Urea Nitrogen 14.7 6.0 - 20.0 mg/dL    Creatinine 0.84 0.51 - 0.95 mg/dL    GFR Estimate >90 >60 mL/min/1.73m2    Calcium 9.6 8.6 - 10.0 mg/dL    Chloride 95 (L) 98 - 107 mmol/L    Glucose 98 70 - 99 mg/dL    Alkaline Phosphatase 72 40 - 150 U/L    AST 27 0 - 45 U/L    ALT 46 0 - 50 U/L    Protein Total 8.5 (H) 6.4 - 8.3 g/dL    Albumin 4.7 3.5 - 5.2 g/dL    Bilirubin Total 1.0 <=1.2 mg/dL   Magnesium   Result Value Ref Range    Magnesium 2.1 1.7 - 2.3 mg/dL   CBC with platelets and differential   Result Value Ref Range    WBC Count 18.6 (H) 4.0 - 11.0 10e3/uL    RBC Count 5.65 (H) 3.80 - 5.20 10e6/uL    Hemoglobin 15.5 11.7 - 15.7 g/dL    Hematocrit 43.0 35.0 - 47.0 %    MCV 76 (L) 78 - 100 fL    MCH 27.4 26.5 - 33.0 pg    MCHC 36.0 31.5 - 36.5 g/dL    RDW 13.9 10.0 - 15.0 %    Platelet Count 295 150 - 450 10e3/uL    % Neutrophils 71 %    % Lymphocytes 18 %    % Monocytes 10 %    % Eosinophils 0 %    % Basophils 0 %    % Immature Granulocytes 1 %    NRBCs per 100 WBC 0 <1 /100    Absolute Neutrophils 13.2 (H) 1.6 - 8.3 10e3/uL    Absolute Lymphocytes 3.4 0.8 - 5.3 10e3/uL    Absolute Monocytes 1.8 (H) 0.0 - 1.3 10e3/uL    Absolute Eosinophils 0.0 0.0 - 0.7 10e3/uL    Absolute Basophils 0.1 0.0 - 0.2 10e3/uL    Absolute Immature Granulocytes 0.1 <=0.4 10e3/uL    Absolute NRBCs 0.0 10e3/uL   Extra Tube    Narrative    The following orders were created for panel order Extra Tube.  Procedure                               Abnormality          Status                     ---------                               -----------         ------                     Extra Blue Top Tube[[id number]]                              Final result               Extra Red Top Tube[[id number]]                               Final result               Extra Green Top (Lithium...[[id number]]                      Final result                 Please view results for these tests on the individual orders.   Extra Blue Top Tube   Result Value Ref Range    Hold Specimen x    Extra Red Top Tube   Result Value Ref Range    Hold Specimen x    Extra Green Top (Lithium Heparin) ON ICE   Result Value Ref Range    Hold Specimen x        Medications   sodium chloride 0.9% BOLUS 1,000 mL (0 mLs Intravenous Stopped 1/11/24 1831)   ondansetron (ZOFRAN) injection 4 mg (4 mg Intravenous $Given 1/11/24 1733)   potassium chloride ER (KLOR-CON M) CR tablet 20 mEq (20 mEq Oral $Given 1/11/24 1834)       Assessments & Plan (with Medical Decision Making)   Patient arrived and appears uncomfortable due to nausea and vomiting.    She had an elevated WBC to 18.6, mild hypokalemia otherwise very stable appearing lab work.  She is given fluids, Zofran and potassium.    Upon reassessment she seems to be doing very well with no discomfort.  She is drinking fluids and having crackers without difficulty.  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  Seems consistent with her presentation today and therefore no further workup is indicated.  Will send her home with some capsaicin cream and she will continue with home antinausea medications.    Strict return precautions are given to the pt, they will return if symptoms are worsening or concerning. The pt understands and agrees with the plan and they are discharged.     Abram Kern PA-C    I have reviewed the nursing notes.    I have reviewed the  findings, diagnosis, plan and need for follow up with the patient.          Discharge Medication List as of 1/11/2024  6:31 PM        START taking these medications    Details   capsaicin (ZOSTRIX) 0.025 % external cream Apply 1 g topically 3 times daily Apply 1 g topically 3 times daily as needed (use this cream on your chest after you have taken a hot shower for any nausea or vomiting that you may experience), Disp-118 mL, R-0, E-Prescribe             Final diagnoses:   Nausea and vomiting   Hypokalemia       1/11/2024   [facility]  01/12/24 0003

## 2024-01-12 NOTE — DISCHARGE INSTRUCTIONS
Get plenty of fluids and rest.  Continue with your home antinausea regimen.  Sure to eat foods with potassium such as bananas.  We will send you home with some capsaicin cream to see if that will help your symptoms, take as directed.  Return if there are worsening or concerning symptoms.  Follow-up with PCP as needed.

## 2024-10-24 ENCOUNTER — OFFICE VISIT (OUTPATIENT)
Dept: FAMILY MEDICINE | Facility: OTHER | Age: 34
End: 2024-10-24
Attending: NURSE PRACTITIONER

## 2024-10-24 VITALS
BODY MASS INDEX: 54.37 KG/M2 | DIASTOLIC BLOOD PRESSURE: 78 MMHG | HEART RATE: 80 BPM | TEMPERATURE: 98.1 F | RESPIRATION RATE: 20 BRPM | OXYGEN SATURATION: 95 % | HEIGHT: 61 IN | WEIGHT: 288 LBS | SYSTOLIC BLOOD PRESSURE: 130 MMHG

## 2024-10-24 DIAGNOSIS — J06.9 UPPER RESPIRATORY TRACT INFECTION, UNSPECIFIED TYPE: Primary | ICD-10-CM

## 2024-10-24 LAB
FLUAV RNA SPEC QL NAA+PROBE: NEGATIVE
FLUBV RNA RESP QL NAA+PROBE: NEGATIVE
GROUP A STREP BY PCR: NOT DETECTED
RSV RNA SPEC NAA+PROBE: NEGATIVE
SARS-COV-2 RNA RESP QL NAA+PROBE: NEGATIVE

## 2024-10-24 PROCEDURE — 87651 STREP A DNA AMP PROBE: CPT | Mod: ZL | Performed by: STUDENT IN AN ORGANIZED HEALTH CARE EDUCATION/TRAINING PROGRAM

## 2024-10-24 PROCEDURE — 87637 SARSCOV2&INF A&B&RSV AMP PRB: CPT | Mod: ZL | Performed by: STUDENT IN AN ORGANIZED HEALTH CARE EDUCATION/TRAINING PROGRAM

## 2024-10-24 PROCEDURE — 99213 OFFICE O/P EST LOW 20 MIN: CPT | Performed by: STUDENT IN AN ORGANIZED HEALTH CARE EDUCATION/TRAINING PROGRAM

## 2024-10-24 ASSESSMENT — PAIN SCALES - GENERAL: PAINLEVEL_OUTOF10: MODERATE PAIN (5)

## 2024-10-24 NOTE — NURSING NOTE
"Chief Complaint   Patient presents with    Cough    Pharyngitis    Fever    Headache    Nasal Congestion     Patient here for cough, sore throat, headache, fever, and nasal congestion x3 days. Has treated with musinex and vitamin c.     Initial /78 (BP Location: Right arm, Patient Position: Sitting, Cuff Size: Adult Regular)   Pulse 80   Temp 98.1  F (36.7  C) (Tympanic)   Resp 20   Ht 1.549 m (5' 1\")   Wt 130.6 kg (288 lb)   SpO2 95%   BMI 54.42 kg/m   Estimated body mass index is 54.42 kg/m  as calculated from the following:    Height as of this encounter: 1.549 m (5' 1\").    Weight as of this encounter: 130.6 kg (288 lb).  Medication Review: complete    The next two questions are to help us understand your food security.  If you are feeling you need any assistance in this area, we have resources available to support you today.          10/24/2024   SDOH- Food Insecurity   Within the past 12 months, did you worry that your food would run out before you got money to buy more? N   Within the past 12 months, did the food you bought just not last and you didn t have money to get more? N            Health Care Directive:  Patient does not have a Health Care Directive: Discussed advance care planning with patient; however, patient declined at this time.    Sera Israel LPN      "

## 2024-10-24 NOTE — PROGRESS NOTES
"  Assessment & Plan     (R05.9) Cough  (primary encounter diagnosis)    Comment: Upper respiratory infection.  Symptoms x 3 days.  Vital signs are stable.  COVID, influenza, RSV, and strep are all negative today.  No evidence of otitis media.  Most likely viral sinusitis at this time.    Plan: Group A Streptococcus PCR Throat Swab,         Symptomatic Influenza A/B, RSV, & SARS-CoV2 PCR        (COVID-19) Nose            Continue over-the-counter management.  Follow-up with PCP for any persisting symptoms.  Return to rapid clinic or ER for any worsening or changing symptoms.  She is comfortable and agreeable with this plan.      Chip Whitman is a 34 year old, presenting for the following health issues:  Cough, Pharyngitis, Fever, Headache, and Nasal Congestion    HPI     Patient presents today with a 3-day history of cough, congestion, sore throat, sinus pressure, nasal congestion, and fever.  States symptoms have remained somewhat persistent.  Not worsening but also not improving.  She notes her sinus pressure and pain continue to bother her.  She notes some ear congestion as well.  She does have polyps in her nose, does use Flonase as well as Zyrtec for allergy symptoms.  No recent COVID testing.      Review of Systems  Constitutional, HEENT, cardiovascular, pulmonary, gi and gu systems are negative, except as otherwise noted.        Objective    /78 (BP Location: Right arm, Patient Position: Sitting, Cuff Size: Adult Regular)   Pulse 80   Temp 98.1  F (36.7  C) (Tympanic)   Resp 20   Ht 1.549 m (5' 1\")   Wt 130.6 kg (288 lb)   SpO2 95%   BMI 54.42 kg/m    Body mass index is 54.42 kg/m .      Physical Exam   GENERAL: alert and no distress  EYES: Eyes grossly normal to inspection, PERRL and conjunctivae and sclerae normal  HENT: ear canals clear, TMs slightly dulled but no erythema, bulging, or suppurative fluid, nose and mouth without ulcers or lesions  NECK: no adenopathy, no asymmetry, " masses, or scars  RESP: lungs clear to auscultation - no rales, rhonchi or wheezes  CV: regular rate and rhythm, normal S1 S2, no S3 or S4, no murmur, click or rub, no peripheral edema      Results for orders placed or performed in visit on 10/24/24   Symptomatic Influenza A/B, RSV, & SARS-CoV2 PCR (COVID-19) Nose     Status: Normal    Specimen: Nose; Swab   Result Value Ref Range    Influenza A PCR Negative Negative    Influenza B PCR Negative Negative    RSV PCR Negative Negative    SARS CoV2 PCR Negative Negative    Narrative    Testing was performed using the Xpert Xpress CoV2/Flu/RSV Assay on the [employer] Instrument. This test should be ordered for the detection of SARS-CoV2, influenza, and RSV viruses in individuals with signs and symptoms of respiratory tract infection. This test is for in vitro diagnostic use under the [place] FDA for laboratories certified under CLIA to perform high or moderate complexity testing. This test has been [place] FDA cleared. A negative result does not rule out the presence of PCR inhibitors in the specimen or target RNA in concentration below the limit of detection for the assay. If only one viral target is positive but coinfection with multiple targets is suspected, the sample should be re-tested with another FDA cleared, approved, or authorized test, if coninfection would change clinical management. This test was validated by the [facility]. These laboratories are certified under the Clinical Laboratory Improvement Amendments of 1988 (CLIA-88) as qualified to perfom high complexity laboratory testing.   Group A Streptococcus PCR Throat Swab     Status: Normal    Specimen: Throat; Swab   Result Value Ref Range    Group A strep by PCR Not Detected Not Detected    Narrative    The Xpert Xpress Strep A test, performed on the [employer]  Instrument Systems, is a rapid, qualitative in vitro diagnostic test for the detection of Streptococcus pyogenes (Group A  ß-hemolytic Streptococcus, Strep A) in throat swab specimens from patients with signs and symptoms of pharyngitis. The Xpert Xpress Strep A test can be used as an aid in the diagnosis of Group A Streptococcal pharyngitis. The assay is not intended to monitor treatment for Group A Streptococcus infections. The Xpert Xpress Strep A test utilizes an automated real-time polymerase chain reaction (PCR) to detect Streptococcus pyogenes DNA.         Signed Electronically by: Mary Kate Hagne PA-C

## 2024-10-24 NOTE — LETTER
October 24, 2024      Antonette Sears  [address]  [place] [address]        To Whom It May Concern:    Antonette Sears  was seen on 10/24/24.  Please excuse her this week as needed due to illness.        Sincerely,        Mary Kate Hagen PA-C

## 2025-02-26 NOTE — PATIENT INSTRUCTIONS
Cough    COVID, strep, influenza, RSV tests are pending. Will be available later today.     Robitussin for suppression of cough.  Mucinex to thin secretions.  Sudafed for congestion.    Lots of fluids.  Cough drops.    Follow-up with primary care if symptoms persist.  Return to rapid clinic or go to the emergency room if symptoms worsen or change.     Mohinder Murrell MD, Attending Physician:     Summary: 43-year-old female who is , s/p 5 miscarriages, who presents with vaginal bleeding, was told by her OB/GYN that she is miscarrying around 6 weeks of pregnancy, was offered surgical medical intervention, but decided to go with expectant management.  Patient is now approximately 11 weeks, and has had quicker bleeding than normal including 2 blood clots, and symptoms of presyncope.    In the ED, labs with leukocytosis of 13.76, initial hemoglobin 12.6, hCG 4369, Rh+.  TVUS showed findings suggestive of hypovascular retained products of conception with endometrial hematoma.  Patient was seen by OB/GYN who recommended no acute intervention.    Patient was placed in the CDU for further monitoring, frequent reassessments, Q4 hour vital signs, monitoring of vaginal bleeding, pain control as needed, repeat CBC and OB/GYN following.    Hemoglobin down trended from 12.6 down to 10.7 down to 9.8.  However patient stated vaginal bleeding had decreased.    Patient was seen by OB/GYN in the morning and states that bleeding is improving, beta has appropriately dropped and no evidence of retained products, hemoglobin appropriately stable given clinical course, and recommended no further diagnostic or therapeutic intervention in the CDU/hospital, and appropriate for outpatient OB/GYN follow-up.    Patient was evaluated by me in the morning, and reports improved symptoms.  States that her vaginal bleeding is now like a light period.  On examination, patient with stable vitals, well-appearing, in no acute distress. Cardiac examination RRR, lungs CTAB.  ABD: Abdomen soft, non-tender and non-distended, no rebound, no guarding, no rigidity. No CVA tenderness..  Neurovascularly intact in all 4 extremities.    Stable for discharge with close follow-up and strict return precautions. Discussed the indications and side-effects of applicable medications.  Informed patient of all diagnostic test results including labs and imaging. The patient has been informed of all concerning signs and symptoms to return to Emergency Department, the necessity to follow up with OB/GYN within 2-3 days was explained, or to return to the ED if unable to follow-up appropriately, and the patient reports understanding of above with capacity and insight. Mohinder Murrell MD, Attending Physician:     Summary: 43-year-old female who is , s/p 5 miscarriages, who presents with vaginal bleeding, was told by her OB/GYN that she is miscarrying around 6 weeks of pregnancy, was offered surgical medical intervention, but decided to go with expectant management.  Patient is now approximately 11 weeks, and has had quicker bleeding than normal including 2 blood clots, and symptoms of presyncope.    In the ED, labs with leukocytosis of 13.76, initial hemoglobin 12.6, hCG 4369, Rh+.  TVUS showed findings suggestive of hypovascular retained products of conception with endometrial hematoma.  Patient was seen by OB/GYN who recommended no acute intervention.    Patient was placed in the CDU for further monitoring, frequent reassessments, Q4 hour vital signs, monitoring of vaginal bleeding, pain control as needed, repeat CBC and OB/GYN following.    Hemoglobin down trended from 12.6 down to 10.7 down to 9.8.  However patient stated vaginal bleeding had decreased.    Patient was seen by OB/GYN in the morning and states that bleeding is improving, beta has appropriately dropped and no evidence of retained products, hemoglobin appropriately stable given clinical course, and recommended no further diagnostic or therapeutic intervention in the CDU/hospital, and appropriate for outpatient OB/GYN follow-up.    Patient was evaluated by me in the morning, and reports improved symptoms.  States that her vaginal bleeding is now like a light period.  On examination, patient with stable vitals, well-appearing, in no acute distress. Cardiac examination RRR, lungs CTAB.  ABD: Abdomen soft, non-tender and non-distended, no rebound, no guarding, no rigidity. No CVA tenderness..  Neurovascularly intact in all 4 extremities.    Stable for discharge with close follow-up and strict return precautions (including persistence or worsening of vaginal bleeding, soaking through 1 pad/hour, passage of clot larger than fist,  lightheadedness, dizziness, vomiting, inability to eat and drink, fever or chills). Discussed the indications and side-effects of applicable medications.  Informed patient of all diagnostic test results including labs and imaging. The patient has been informed of all concerning signs and symptoms to return to Emergency Department, the necessity to follow up with OB/GYN within 2-3 days was explained, or to return to the ED if unable to follow-up appropriately, and the patient reports understanding of above with capacity and insight.

## 2025-03-24 ENCOUNTER — HOSPITAL ENCOUNTER (EMERGENCY)
Facility: OTHER | Age: 35
Discharge: HOME | End: 2025-03-24
Attending: PHYSICIAN ASSISTANT
Payer: COMMERCIAL

## 2025-03-24 VITALS
HEIGHT: 61 IN | WEIGHT: 270 LBS | TEMPERATURE: 96.3 F | HEART RATE: 81 BPM | SYSTOLIC BLOOD PRESSURE: 133 MMHG | DIASTOLIC BLOOD PRESSURE: 77 MMHG | BODY MASS INDEX: 50.98 KG/M2 | RESPIRATION RATE: 16 BRPM | OXYGEN SATURATION: 97 %

## 2025-03-24 DIAGNOSIS — R11.2 NAUSEA AND VOMITING: ICD-10-CM

## 2025-03-24 LAB
ALBUMIN UR-MCNC: 30 MG/DL
AMPHETAMINES UR QL SCN: ABNORMAL
ANION GAP SERPL CALCULATED.3IONS-SCNC: 18 MMOL/L (ref 7–15)
APPEARANCE UR: ABNORMAL
BACTERIA #/AREA URNS HPF: ABNORMAL /HPF
BARBITURATES UR QL SCN: ABNORMAL
BASOPHILS # BLD AUTO: 0.1 10E3/UL (ref 0–0.2)
BASOPHILS NFR BLD AUTO: 0 %
BENZODIAZ UR QL SCN: ABNORMAL
BILIRUB UR QL STRIP: NEGATIVE
BUN SERPL-MCNC: 12.3 MG/DL (ref 6–20)
BZE UR QL SCN: ABNORMAL
CALCIUM SERPL-MCNC: 9.6 MG/DL (ref 8.8–10.4)
CANNABINOIDS UR QL SCN: ABNORMAL
CHLORIDE SERPL-SCNC: 104 MMOL/L (ref 98–107)
COLOR UR AUTO: ABNORMAL
CREAT SERPL-MCNC: 0.81 MG/DL (ref 0.51–0.95)
EGFRCR SERPLBLD CKD-EPI 2021: >90 ML/MIN/1.73M2
EOSINOPHIL # BLD AUTO: 0.1 10E3/UL (ref 0–0.7)
EOSINOPHIL NFR BLD AUTO: 0 %
ERYTHROCYTE [DISTWIDTH] IN BLOOD BY AUTOMATED COUNT: 14.6 % (ref 10–15)
FENTANYL UR QL: ABNORMAL
FLUAV RNA SPEC QL NAA+PROBE: NEGATIVE
FLUBV RNA RESP QL NAA+PROBE: NEGATIVE
GLUCOSE SERPL-MCNC: 137 MG/DL (ref 70–99)
GLUCOSE UR STRIP-MCNC: NEGATIVE MG/DL
HCG SERPL QL: NEGATIVE
HCO3 SERPL-SCNC: 18 MMOL/L (ref 22–29)
HCT VFR BLD AUTO: 41.9 % (ref 35–47)
HGB BLD-MCNC: 14.3 G/DL (ref 11.7–15.7)
HGB UR QL STRIP: NEGATIVE
HOLD SPECIMEN: NORMAL
IMM GRANULOCYTES # BLD: 0.1 10E3/UL
IMM GRANULOCYTES NFR BLD: 1 %
KETONES UR STRIP-MCNC: NEGATIVE MG/DL
LEUKOCYTE ESTERASE UR QL STRIP: NEGATIVE
LYMPHOCYTES # BLD AUTO: 1.6 10E3/UL (ref 0.8–5.3)
LYMPHOCYTES NFR BLD AUTO: 8 %
MAGNESIUM SERPL-MCNC: 2.1 MG/DL (ref 1.7–2.3)
MCH RBC QN AUTO: 27.9 PG (ref 26.5–33)
MCHC RBC AUTO-ENTMCNC: 34.1 G/DL (ref 31.5–36.5)
MCV RBC AUTO: 82 FL (ref 78–100)
MONOCYTES # BLD AUTO: 0.8 10E3/UL (ref 0–1.3)
MONOCYTES NFR BLD AUTO: 4 %
MUCOUS THREADS #/AREA URNS LPF: PRESENT /LPF
NEUTROPHILS # BLD AUTO: 16.6 10E3/UL (ref 1.6–8.3)
NEUTROPHILS NFR BLD AUTO: 86 %
NITRATE UR QL: NEGATIVE
NRBC # BLD AUTO: 0 10E3/UL
NRBC BLD AUTO-RTO: 0 /100
OPIATES UR QL SCN: ABNORMAL
PCP QUAL URINE (ROCHE): ABNORMAL
PH UR STRIP: 5.5 [PH] (ref 5–9)
PLATELET # BLD AUTO: 239 10E3/UL (ref 150–450)
POTASSIUM SERPL-SCNC: 3.8 MMOL/L (ref 3.4–5.3)
RBC # BLD AUTO: 5.13 10E6/UL (ref 3.8–5.2)
RBC URINE: 0 /HPF
RSV RNA SPEC NAA+PROBE: NEGATIVE
SARS-COV-2 RNA RESP QL NAA+PROBE: NEGATIVE
SODIUM SERPL-SCNC: 140 MMOL/L (ref 135–145)
SP GR UR STRIP: 1.03 (ref 1–1.03)
SQUAMOUS EPITHELIAL: 17 /HPF
TROPONIN T SERPL HS-MCNC: <6 NG/L
UROBILINOGEN UR STRIP-MCNC: NORMAL MG/DL
WBC # BLD AUTO: 19.2 10E3/UL (ref 4–11)
WBC URINE: 30 /HPF

## 2025-03-24 PROCEDURE — 258N000003 HC RX IP 258 OP 636: Performed by: PHYSICIAN ASSISTANT

## 2025-03-24 PROCEDURE — 96374 THER/PROPH/DIAG INJ IV PUSH: CPT | Performed by: PHYSICIAN ASSISTANT

## 2025-03-24 PROCEDURE — 80307 DRUG TEST PRSMV CHEM ANLYZR: CPT | Performed by: PHYSICIAN ASSISTANT

## 2025-03-24 PROCEDURE — 85025 COMPLETE CBC W/AUTO DIFF WBC: CPT | Performed by: PHYSICIAN ASSISTANT

## 2025-03-24 PROCEDURE — 80048 BASIC METABOLIC PNL TOTAL CA: CPT | Performed by: PHYSICIAN ASSISTANT

## 2025-03-24 PROCEDURE — 81001 URINALYSIS AUTO W/SCOPE: CPT | Performed by: PHYSICIAN ASSISTANT

## 2025-03-24 PROCEDURE — 87086 URINE CULTURE/COLONY COUNT: CPT | Performed by: PHYSICIAN ASSISTANT

## 2025-03-24 PROCEDURE — 36415 COLL VENOUS BLD VENIPUNCTURE: CPT | Performed by: PHYSICIAN ASSISTANT

## 2025-03-24 PROCEDURE — 93010 ELECTROCARDIOGRAM REPORT: CPT | Performed by: INTERNAL MEDICINE

## 2025-03-24 PROCEDURE — 93005 ELECTROCARDIOGRAM TRACING: CPT | Performed by: PHYSICIAN ASSISTANT

## 2025-03-24 PROCEDURE — 96361 HYDRATE IV INFUSION ADD-ON: CPT | Performed by: PHYSICIAN ASSISTANT

## 2025-03-24 PROCEDURE — 84703 CHORIONIC GONADOTROPIN ASSAY: CPT | Performed by: PHYSICIAN ASSISTANT

## 2025-03-24 PROCEDURE — 99284 EMERGENCY DEPT VISIT MOD MDM: CPT | Performed by: PHYSICIAN ASSISTANT

## 2025-03-24 PROCEDURE — 250N000011 HC RX IP 250 OP 636: Performed by: PHYSICIAN ASSISTANT

## 2025-03-24 PROCEDURE — 87637 SARSCOV2&INF A&B&RSV AMP PRB: CPT | Performed by: PHYSICIAN ASSISTANT

## 2025-03-24 PROCEDURE — 99284 EMERGENCY DEPT VISIT MOD MDM: CPT | Mod: 25 | Performed by: PHYSICIAN ASSISTANT

## 2025-03-24 PROCEDURE — 84484 ASSAY OF TROPONIN QUANT: CPT | Performed by: PHYSICIAN ASSISTANT

## 2025-03-24 PROCEDURE — 250N000009 HC RX 250: Performed by: PHYSICIAN ASSISTANT

## 2025-03-24 PROCEDURE — 83735 ASSAY OF MAGNESIUM: CPT | Performed by: PHYSICIAN ASSISTANT

## 2025-03-24 RX ORDER — ONDANSETRON 4 MG/1
4 TABLET, ORALLY DISINTEGRATING ORAL ONCE
Status: COMPLETED | OUTPATIENT
Start: 2025-03-24 | End: 2025-03-24

## 2025-03-24 RX ORDER — SCOPOLAMINE 1 MG/3D
1 PATCH, EXTENDED RELEASE TRANSDERMAL
Status: DISCONTINUED | OUTPATIENT
Start: 2025-03-24 | End: 2025-03-24 | Stop reason: HOSPADM

## 2025-03-24 RX ORDER — CAPSAICIN 0.025 %
CREAM (GRAM) TOPICAL ONCE
Status: COMPLETED | OUTPATIENT
Start: 2025-03-24 | End: 2025-03-24

## 2025-03-24 RX ORDER — ONDANSETRON 2 MG/ML
4 INJECTION INTRAMUSCULAR; INTRAVENOUS ONCE
Status: COMPLETED | OUTPATIENT
Start: 2025-03-24 | End: 2025-03-24

## 2025-03-24 RX ORDER — ONDANSETRON 2 MG/ML
4 INJECTION INTRAMUSCULAR; INTRAVENOUS ONCE
Status: DISCONTINUED | OUTPATIENT
Start: 2025-03-24 | End: 2025-03-24

## 2025-03-24 RX ADMIN — ONDANSETRON 4 MG: 2 INJECTION INTRAMUSCULAR; INTRAVENOUS at 13:06

## 2025-03-24 RX ADMIN — SODIUM CHLORIDE 1000 ML: 0.9 INJECTION, SOLUTION INTRAVENOUS at 11:25

## 2025-03-24 RX ADMIN — ONDANSETRON 4 MG: 4 TABLET, ORALLY DISINTEGRATING ORAL at 09:49

## 2025-03-24 RX ADMIN — SCOPOLAMINE 1 PATCH: 1 SYSTEM TRANSDERMAL at 15:02

## 2025-03-24 ASSESSMENT — ENCOUNTER SYMPTOMS
ABDOMINAL PAIN: 0
DIARRHEA: 0
VOMITING: 1
LIGHT-HEADEDNESS: 1
SHORTNESS OF BREATH: 0
FATIGUE: 1
DYSURIA: 0
FEVER: 1
NAUSEA: 1
CHILLS: 1
COUGH: 0

## 2025-03-24 ASSESSMENT — COLUMBIA-SUICIDE SEVERITY RATING SCALE - C-SSRS
6. HAVE YOU EVER DONE ANYTHING, STARTED TO DO ANYTHING, OR PREPARED TO DO ANYTHING TO END YOUR LIFE?: NO
2. HAVE YOU ACTUALLY HAD ANY THOUGHTS OF KILLING YOURSELF IN THE PAST MONTH?: NO
1. IN THE PAST MONTH, HAVE YOU WISHED YOU WERE DEAD OR WISHED YOU COULD GO TO SLEEP AND NOT WAKE UP?: NO

## 2025-03-24 ASSESSMENT — ACTIVITIES OF DAILY LIVING (ADL)
ADLS_ACUITY_SCORE: 41

## 2025-03-24 NOTE — LETTER
March 24, 2025      To Whom It May Concern:      Antonette Sears was seen in our Emergency Department today, 03/24/25.  I expect her condition to improve over the next 1-3 days.  She may return to work when improved.    Sincerely,        NATALIE Chinchilla  Electronically signed

## 2025-03-24 NOTE — ED NOTES
Patient feels slightly improved at this time, she states she has not vomited for about twenty minutes and has been sipping water, she refuses soda crackers at this time.

## 2025-03-24 NOTE — ED TRIAGE NOTES
Pt arrives via private vehicle from home with c/o N/V that started this morning. Interventions at home pta include water. Pt states she is now dizzy.      Triage Assessment (Adult)       Row Name 03/24/25 0803          Triage Assessment    Airway WDL WDL        Respiratory WDL    Respiratory WDL WDL        Skin Circulation/Temperature WDL    Skin Circulation/Temperature WDL X        Cardiac WDL    Cardiac WDL WDL        Peripheral/Neurovascular WDL    Peripheral Neurovascular WDL WDL        Cognitive/Neuro/Behavioral WDL    Cognitive/Neuro/Behavioral WDL WDL

## 2025-03-24 NOTE — DISCHARGE INSTRUCTIONS
Get plenty of fluids and rest.  As discussed, your lab work appears stable however we did discuss that your increased white blood cell count and a couple of other mild abnormalities.  The nausea and vomiting alone can cause some of these numbers to be changed, it seems less likely there is an infectious process occurring it was decided to hold off on further imaging at this time.  It is not entirely clear what is causing the nausea and vomiting, we did discuss that cannabis use can cause this to occur, see attached information but it is unclear to me whether that is happening or not.  Try to avoid cannabis use in the future.  We did place a antinausea patch that can be present for 3 days and slowly release medication to help your symptoms.  Have small sips of fluids and bland diet over the coming days.  You should return if you have increased fevers, increased discomfort, intractable nausea and vomiting and dehydration.  Continue to follow-up with PCP

## 2025-03-24 NOTE — ED PROVIDER NOTES
History     Chief Complaint   Patient presents with    Nausea & Vomiting     HPI  Antonette Sears is a 34 year old female who presents to the ED for evaluation of n/v. Pt arrives via private vehicle from home with c/o N/V that started this morning. Interventions at home pta include water. Pt states she is now dizzy.     Allergies:  Allergies   Allergen Reactions    Codeine Nausea and Vomiting    Morphine        Problem List:    Patient Active Problem List    Diagnosis Date Noted    Former smoker 04/19/2023     Priority: Medium     Formatting of this note might be different from the original.  IMO Update 10/11      Mild intermittent asthma without complication 04/19/2023     Priority: Medium    Migraine with aura and without status migrainosus, not intractable 04/19/2023     Priority: Medium     Formatting of this note might be different from the original.  IMO Update 10/11      IUD (intrauterine device) in place 04/17/2023     Priority: Medium    Depression with anxiety 09/12/2022     Priority: Medium    RLS (restless legs syndrome) 08/03/2021     Priority: Medium    Nausea 12/29/2020     Priority: Medium    Chest pain 12/29/2020     Priority: Medium    Elevated troponin 12/29/2020     Priority: Medium    Hidradenitis suppurativa 12/29/2020     Priority: Medium    NSTEMI (non-ST elevated myocardial infarction) (H) 12/29/2020     Priority: Medium    Obstructive sleep apnea syndrome 12/29/2020     Priority: Medium    Morbid obesity (H) 01/29/2019     Priority: Medium    Postpartum depression 07/30/2018     Priority: Medium    Abnormal quad screen 04/20/2018     Priority: Medium    Allergic rhinitis 02/19/2018     Priority: Medium    Asthma 02/19/2018     Priority: Medium    Esophageal reflux 02/19/2018     Priority: Medium    Intractable migraine without aura 10/23/2013     Priority: Medium    RTA (renal tubular acidosis) 11/05/2012     Priority: Medium    Unspecified disorder of menstruation and other abnormal  bleeding from female genital tract 2012     Priority: Medium    Contraceptive management 2011     Priority: Medium    CTS (carpal tunnel syndrome) 2011     Priority: Medium     Formatting of this note might be different from the original.  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  IMO Update 10/11      Menstrual migraine 2010     Priority: Medium        Past Medical History:    Past Medical History:   Diagnosis Date    Cervicalgia        Past Surgical History:    Past Surgical History:   Procedure Laterality Date    AXILLARY SURGERY      CARPAL TUNNEL RELEASE RT/LT Bilateral      SECTION  2018    CHOLECYSTECTOMY      Age 16    CHOLECYSTECTOMY      COLONOSCOPY  2012    EGD  2008    UPPER GASTROINTESTINAL ENDOSCOPY  2022       Family History:    Family History   Problem Relation Age of Onset    Hypertension Mother     Cholelithiasis Mother     Carpal tunnel syndrome Mother     Asthma Mother     Hyperlipidemia Mother     Chirinos's esophagus Mother     Diabetes Type 2  Mother     Asthma Father     Cardiomyopathy Father     Heart Failure Father     GERD Father     Alcoholism Father     Cholelithiasis Maternal Grandmother     Diabetes Maternal Grandfather     Diabetes Paternal Grandmother     Substance Abuse Brother     Substance Abuse Sister     Attention Deficit Disorder Son     Asthma Son     Retinopathy of prematurity Son        Social History:  Marital Status:  Single [1]  Social History     Tobacco Use    Smoking status: Former     Current packs/day: 0.00     Types: Cigarettes     Quit date: 2012     Years since quittin.9    Smokeless tobacco: Never   Vaping Use    Vaping status: Never Used   Substance Use Topics    Alcohol use: No    Drug use: Yes     Frequency: 7.0 times per week     Types:  "Other, Marijuana     Comment: daily use        Medications:    albuterol (PROAIR HFA/PROVENTIL HFA/VENTOLIN HFA) 108 (90 Base) MCG/ACT inhaler  busPIRone (BUSPAR) 10 MG tablet  capsaicin (ZOSTRIX) 0.025 % external cream  cetirizine (ZYRTEC) 10 MG tablet  fluticasone (FLONASE) 50 MCG/ACT nasal spray  metoclopramide (REGLAN) 10 MG tablet  omeprazole (PRILOSEC) 40 MG DR capsule  Ondansetron HCl (ZOFRAN PO)  sertraline (ZOLOFT) 100 MG tablet          Review of Systems   Constitutional:  Positive for chills, fatigue and fever.   HENT:  Negative for congestion.    Respiratory:  Negative for cough and shortness of breath.    Cardiovascular:  Negative for chest pain.   Gastrointestinal:  Positive for nausea and vomiting. Negative for abdominal pain and diarrhea.   Genitourinary:  Negative for dysuria.   Neurological:  Positive for light-headedness. Negative for syncope.       Physical Exam   BP: 133/77  Pulse: 81  Temp: (!) 96.3  F (35.7  C)  Resp: 16  Height: 154.9 cm (5' 1\")  Weight: 122.5 kg (270 lb)  SpO2: 97 %      Physical Exam  Constitutional:       General: She is not in acute distress.     Appearance: She is well-developed. She is not diaphoretic.   HENT:      Head: Normocephalic and atraumatic.   Cardiovascular:      Rate and Rhythm: Normal rate and regular rhythm.   Pulmonary:      Effort: Pulmonary effort is normal.      Breath sounds: Normal breath sounds.   Abdominal:      Palpations: Abdomen is soft.      Tenderness: There is no abdominal tenderness.   Musculoskeletal:         General: No deformity.   Skin:     General: Skin is warm and dry.   Neurological:      Mental Status: She is alert. Mental status is at baseline.   Psychiatric:         Mood and Affect: Mood normal.         Behavior: Behavior normal.         ED Course        Procedures         EKG read at 1023. HR 77, NSR, T wave inversion lead 3 otherwise no ST changes. Similar to previous.    Critical Care time:  none           No results found for " this or any previous visit (from the past 24 hours).    Medications   sodium chloride 0.9% BOLUS 1,000 mL (0 mLs Intravenous Stopped 3/24/25 4925)   ondansetron (ZOFRAN ODT) ODT tab 4 mg (4 mg Oral $Given 3/24/25 0937)   capsaicin (ZOSTRIX) 0.025 % cream ( Topical Not Given 3/24/25 1126)   ondansetron (ZOFRAN) injection 4 mg (4 mg Intravenous $Given 3/24/25 1306)       Assessments & Plan (with Medical Decision Making)   Nontoxic but is nauseous and has an episode of vomiting when I am in the room. VSS, afebrile.     Leukocytosis, increased anion gap, seems most consistent with her increased vomiting episodes.     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     From discharge:   As discussed, your lab work appears stable however we did discuss that your increased white blood cell count and a couple of other mild abnormalities.  The nausea and vomiting alone can cause some of these numbers to be changed, it seems less likely there is an infectious process occurring it was decided to hold off on further imaging at this time.  It is not entirely clear what is causing the nausea and vomiting, we did discuss that cannabis use can cause this to occur, see attached information but it is unclear to me whether that is happening or not.  Try to avoid cannabis use in the future.  We did place a antinausea patch that can be present for 3 days and slowly release medication to help your symptoms.  Have small sips of fluids and bland diet over the coming days.  You should return if you have increased fevers, increased discomfort, intractable nausea and vomiting and dehydration.  Continue to follow-up with PCP    Strict return precautions are given to the pt, they will return if symptoms are worsening or concerning. The pt understands and agrees with the plan and they are discharged.     Abram Kern PA-C    I have reviewed the nursing notes.    I have reviewed the findings, diagnosis, plan and need for  follow up with the patient.        Discharge Medication List as of 3/24/2025  2:55 PM          Final diagnoses:   Nausea and vomiting       3/24/2025   [facility] AND [facility]       Abram Kern PA  03/26/25 1050

## 2025-03-25 LAB — BACTERIA UR CULT: NORMAL

## 2025-03-27 LAB
ATRIAL RATE - MUSE: 77 BPM
DIASTOLIC BLOOD PRESSURE - MUSE: NORMAL MMHG
INTERPRETATION ECG - MUSE: NORMAL
P AXIS - MUSE: 32 DEGREES
PR INTERVAL - MUSE: 168 MS
QRS DURATION - MUSE: 102 MS
QT - MUSE: 396 MS
QTC - MUSE: 448 MS
R AXIS - MUSE: 32 DEGREES
SYSTOLIC BLOOD PRESSURE - MUSE: NORMAL MMHG
T AXIS - MUSE: 5 DEGREES
VENTRICULAR RATE- MUSE: 77 BPM

## 2025-07-19 ENCOUNTER — HEALTH MAINTENANCE LETTER (OUTPATIENT)
Age: 35
End: 2025-07-19

## (undated) RX ORDER — ONDANSETRON 2 MG/ML
INJECTION INTRAMUSCULAR; INTRAVENOUS
Status: DISPENSED
Start: 2024-01-11

## (undated) RX ORDER — SUCRALFATE 1 G/1
TABLET ORAL
Status: DISPENSED
Start: 2023-01-27

## (undated) RX ORDER — PANTOPRAZOLE SODIUM 40 MG/10ML
INJECTION, POWDER, LYOPHILIZED, FOR SOLUTION INTRAVENOUS
Status: DISPENSED
Start: 2022-10-15

## (undated) RX ORDER — METOCLOPRAMIDE HYDROCHLORIDE 5 MG/ML
INJECTION INTRAMUSCULAR; INTRAVENOUS
Status: DISPENSED
Start: 2022-10-15

## (undated) RX ORDER — ONDANSETRON 4 MG/1
TABLET, ORALLY DISINTEGRATING ORAL
Status: DISPENSED
Start: 2022-10-14

## (undated) RX ORDER — ONDANSETRON 2 MG/ML
INJECTION INTRAMUSCULAR; INTRAVENOUS
Status: DISPENSED
Start: 2023-01-27

## (undated) RX ORDER — DIPHENHYDRAMINE HYDROCHLORIDE 50 MG/ML
INJECTION INTRAMUSCULAR; INTRAVENOUS
Status: DISPENSED
Start: 2023-01-27

## (undated) RX ORDER — ONDANSETRON 4 MG/1
TABLET, ORALLY DISINTEGRATING ORAL
Status: DISPENSED
Start: 2025-03-24

## (undated) RX ORDER — MAGNESIUM HYDROXIDE/ALUMINUM HYDROXICE/SIMETHICONE 120; 1200; 1200 MG/30ML; MG/30ML; MG/30ML
SUSPENSION ORAL
Status: DISPENSED
Start: 2022-10-15

## (undated) RX ORDER — POTASSIUM CHLORIDE 20MEQ/15ML
LIQUID (ML) ORAL
Status: DISPENSED
Start: 2022-10-15

## (undated) RX ORDER — LIDOCAINE HYDROCHLORIDE 20 MG/ML
SOLUTION OROPHARYNGEAL
Status: DISPENSED
Start: 2022-10-15

## (undated) RX ORDER — ACETAMINOPHEN 325 MG/1
TABLET ORAL
Status: DISPENSED
Start: 2022-10-14

## (undated) RX ORDER — POTASSIUM CHLORIDE 7.45 MG/ML
INJECTION INTRAVENOUS
Status: DISPENSED
Start: 2022-10-15

## (undated) RX ORDER — ONDANSETRON 2 MG/ML
INJECTION INTRAMUSCULAR; INTRAVENOUS
Status: DISPENSED
Start: 2025-03-24

## (undated) RX ORDER — LORAZEPAM 2 MG/ML
INJECTION INTRAMUSCULAR
Status: DISPENSED
Start: 2023-01-27

## (undated) RX ORDER — POTASSIUM CHLORIDE 1500 MG/1
TABLET, EXTENDED RELEASE ORAL
Status: DISPENSED
Start: 2024-01-11